# Patient Record
Sex: MALE | Race: BLACK OR AFRICAN AMERICAN | NOT HISPANIC OR LATINO | Employment: FULL TIME | URBAN - METROPOLITAN AREA
[De-identification: names, ages, dates, MRNs, and addresses within clinical notes are randomized per-mention and may not be internally consistent; named-entity substitution may affect disease eponyms.]

---

## 2023-01-04 ENCOUNTER — TELEPHONE (OUTPATIENT)
Dept: TRANSPLANT | Facility: CLINIC | Age: 53
End: 2023-01-04

## 2023-01-09 ENCOUNTER — SOCIAL WORK (OUTPATIENT)
Dept: TRANSPLANT | Facility: CLINIC | Age: 53
End: 2023-01-09

## 2023-01-09 ENCOUNTER — TELEPHONE (OUTPATIENT)
Dept: TRANSPLANT | Facility: CLINIC | Age: 53
End: 2023-01-09
Payer: COMMERCIAL

## 2023-01-27 ENCOUNTER — TELEPHONE (OUTPATIENT)
Dept: TRANSPLANT | Facility: CLINIC | Age: 53
End: 2023-01-27
Payer: COMMERCIAL

## 2023-01-27 DIAGNOSIS — Z76.82 ORGAN TRANSPLANT CANDIDATE: Primary | ICD-10-CM

## 2023-02-28 ENCOUNTER — TELEPHONE (OUTPATIENT)
Dept: TRANSPLANT | Facility: CLINIC | Age: 53
End: 2023-02-28
Payer: COMMERCIAL

## 2023-02-28 NOTE — TELEPHONE ENCOUNTER
Compliance form sent to [facility]/NEPHROLOGIST OFFICE on  02/28/23  Aslo LM for SW to call back and/or to please fax back the adherence form.    Chico Klein MA

## 2023-03-01 ENCOUNTER — HOSPITAL ENCOUNTER (OUTPATIENT)
Dept: RADIOLOGY | Facility: HOSPITAL | Age: 53
Discharge: HOME | End: 2023-03-01
Attending: NURSE PRACTITIONER
Payer: COMMERCIAL

## 2023-03-01 ENCOUNTER — TELEPHONE (OUTPATIENT)
Dept: TRANSPLANT | Facility: CLINIC | Age: 53
End: 2023-03-01
Payer: COMMERCIAL

## 2023-03-01 ENCOUNTER — HOSPITAL ENCOUNTER (OUTPATIENT)
Dept: RADIOLOGY | Facility: HOSPITAL | Age: 53
Discharge: HOME | End: 2023-03-01
Payer: COMMERCIAL

## 2023-03-01 ENCOUNTER — OFFICE VISIT (OUTPATIENT)
Dept: TRANSPLANT | Facility: CLINIC | Age: 53
End: 2023-03-01
Payer: COMMERCIAL

## 2023-03-01 ENCOUNTER — CLINICAL SUPPORT (OUTPATIENT)
Dept: INFECTIOUS DISEASES | Facility: CLINIC | Age: 53
End: 2023-03-01
Payer: COMMERCIAL

## 2023-03-01 VITALS
TEMPERATURE: 97 F | RESPIRATION RATE: 16 BRPM | WEIGHT: 204.81 LBS | BODY MASS INDEX: 31.04 KG/M2 | SYSTOLIC BLOOD PRESSURE: 140 MMHG | OXYGEN SATURATION: 97 % | DIASTOLIC BLOOD PRESSURE: 75 MMHG | HEIGHT: 68 IN | HEART RATE: 85 BPM

## 2023-03-01 DIAGNOSIS — Z76.82 ORGAN TRANSPLANT CANDIDATE: ICD-10-CM

## 2023-03-01 DIAGNOSIS — E21.3 HPTH (HYPERPARATHYROIDISM): ICD-10-CM

## 2023-03-01 DIAGNOSIS — Z99.2 ESRD ON DIALYSIS: ICD-10-CM

## 2023-03-01 DIAGNOSIS — N18.6 ESRD ON DIALYSIS: ICD-10-CM

## 2023-03-01 DIAGNOSIS — F32.A DEPRESSION, UNSPECIFIED DEPRESSION TYPE: ICD-10-CM

## 2023-03-01 DIAGNOSIS — I10 ESSENTIAL HYPERTENSION: ICD-10-CM

## 2023-03-01 DIAGNOSIS — Z01.818 PRE-TRANSPLANT EVALUATION FOR KIDNEY TRANSPLANT: Primary | ICD-10-CM

## 2023-03-01 LAB
CREAT UR-MCNC: 146 MG/DL (ref 23–375)
PROT UR-MCNC: 192 MG/DL (ref 0–15)
PROT/CREAT UR: 1.32 MG/G{CREAT} (ref 0–0.2)

## 2023-03-01 PROCEDURE — 99999 PR PBB SHADOW E&M-EST. PATIENT-LVL V: ICD-10-PCS | Mod: PBBFAC,TXP,, | Performed by: NURSE PRACTITIONER

## 2023-03-01 PROCEDURE — 3077F SYST BP >= 140 MM HG: CPT | Mod: CPTII,S$GLB,TXP, | Performed by: NURSE PRACTITIONER

## 2023-03-01 PROCEDURE — 90715 TDAP VACCINE GREATER THAN OR EQUAL TO 7YO IM: ICD-10-PCS | Mod: S$GLB,TXP,, | Performed by: INTERNAL MEDICINE

## 2023-03-01 PROCEDURE — 71046 X-RAY EXAM CHEST 2 VIEWS: CPT | Mod: 26,TXP,, | Performed by: RADIOLOGY

## 2023-03-01 PROCEDURE — 3077F PR MOST RECENT SYSTOLIC BLOOD PRESSURE >= 140 MM HG: ICD-10-PCS | Mod: CPTII,S$GLB,TXP, | Performed by: NURSE PRACTITIONER

## 2023-03-01 PROCEDURE — 90471 HEPATITIS A VACCINE ADULT IM: ICD-10-PCS | Mod: S$GLB,TXP,, | Performed by: INTERNAL MEDICINE

## 2023-03-01 PROCEDURE — 90750 HZV VACC RECOMBINANT IM: CPT | Mod: S$GLB,TXP,, | Performed by: INTERNAL MEDICINE

## 2023-03-01 PROCEDURE — 76770 US RETROPERITONEAL COMPLETE: ICD-10-PCS | Mod: 26,TXP,, | Performed by: STUDENT IN AN ORGANIZED HEALTH CARE EDUCATION/TRAINING PROGRAM

## 2023-03-01 PROCEDURE — 3008F BODY MASS INDEX DOCD: CPT | Mod: CPTII,S$GLB,TXP, | Performed by: NURSE PRACTITIONER

## 2023-03-01 PROCEDURE — 90472 ZOSTER RECOMBINANT VACCINE: ICD-10-PCS | Mod: S$GLB,TXP,, | Performed by: INTERNAL MEDICINE

## 2023-03-01 PROCEDURE — 3078F PR MOST RECENT DIASTOLIC BLOOD PRESSURE < 80 MM HG: ICD-10-PCS | Mod: CPTII,S$GLB,TXP, | Performed by: NURSE PRACTITIONER

## 2023-03-01 PROCEDURE — 1160F PR REVIEW ALL MEDS BY PRESCRIBER/CLIN PHARMACIST DOCUMENTED: ICD-10-PCS | Mod: CPTII,S$GLB,TXP, | Performed by: NURSE PRACTITIONER

## 2023-03-01 PROCEDURE — 3008F PR BODY MASS INDEX (BMI) DOCUMENTED: ICD-10-PCS | Mod: CPTII,S$GLB,TXP, | Performed by: NURSE PRACTITIONER

## 2023-03-01 PROCEDURE — 90632 HEPATITIS A VACCINE ADULT IM: ICD-10-PCS | Mod: S$GLB,TXP,, | Performed by: INTERNAL MEDICINE

## 2023-03-01 PROCEDURE — 1159F PR MEDICATION LIST DOCUMENTED IN MEDICAL RECORD: ICD-10-PCS | Mod: CPTII,S$GLB,TXP, | Performed by: NURSE PRACTITIONER

## 2023-03-01 PROCEDURE — 84156 ASSAY OF PROTEIN URINE: CPT | Mod: TXP | Performed by: NURSE PRACTITIONER

## 2023-03-01 PROCEDURE — 99205 OFFICE O/P NEW HI 60 MIN: CPT | Mod: S$GLB,TXP,, | Performed by: NURSE PRACTITIONER

## 2023-03-01 PROCEDURE — 90471 IMMUNIZATION ADMIN: CPT | Mod: S$GLB,TXP,, | Performed by: INTERNAL MEDICINE

## 2023-03-01 PROCEDURE — 4010F ACE/ARB THERAPY RXD/TAKEN: CPT | Mod: CPTII,S$GLB,TXP, | Performed by: NURSE PRACTITIONER

## 2023-03-01 PROCEDURE — 93978 US DOPP ILIACS BILATERAL: ICD-10-PCS | Mod: 26,TXP,, | Performed by: STUDENT IN AN ORGANIZED HEALTH CARE EDUCATION/TRAINING PROGRAM

## 2023-03-01 PROCEDURE — 71046 X-RAY EXAM CHEST 2 VIEWS: CPT | Mod: TC,TXP

## 2023-03-01 PROCEDURE — 4010F PR ACE/ARB THEARPY RXD/TAKEN: ICD-10-PCS | Mod: CPTII,S$GLB,TXP, | Performed by: NURSE PRACTITIONER

## 2023-03-01 PROCEDURE — 90472 IMMUNIZATION ADMIN EACH ADD: CPT | Mod: S$GLB,TXP,, | Performed by: INTERNAL MEDICINE

## 2023-03-01 PROCEDURE — 76770 US EXAM ABDO BACK WALL COMP: CPT | Mod: TC,TXP,59

## 2023-03-01 PROCEDURE — 90750 ZOSTER RECOMBINANT VACCINE: ICD-10-PCS | Mod: S$GLB,TXP,, | Performed by: INTERNAL MEDICINE

## 2023-03-01 PROCEDURE — 99999 PR PBB SHADOW E&M-EST. PATIENT-LVL I: CPT | Mod: PBBFAC,TXP,,

## 2023-03-01 PROCEDURE — 99214 OFFICE O/P EST MOD 30 MIN: CPT | Mod: S$GLB,TXP,, | Performed by: TRANSPLANT SURGERY

## 2023-03-01 PROCEDURE — 99205 PR OFFICE/OUTPT VISIT, NEW, LEVL V, 60-74 MIN: ICD-10-PCS | Mod: S$GLB,TXP,, | Performed by: NURSE PRACTITIONER

## 2023-03-01 PROCEDURE — 72170 X-RAY EXAM OF PELVIS: CPT | Mod: TC,TXP

## 2023-03-01 PROCEDURE — 72170 XR PELVIS ROUTINE AP: ICD-10-PCS | Mod: 26,TXP,, | Performed by: RADIOLOGY

## 2023-03-01 PROCEDURE — 1160F RVW MEDS BY RX/DR IN RCRD: CPT | Mod: CPTII,S$GLB,TXP, | Performed by: NURSE PRACTITIONER

## 2023-03-01 PROCEDURE — 1159F MED LIST DOCD IN RCRD: CPT | Mod: CPTII,S$GLB,TXP, | Performed by: NURSE PRACTITIONER

## 2023-03-01 PROCEDURE — 90715 TDAP VACCINE 7 YRS/> IM: CPT | Mod: S$GLB,TXP,, | Performed by: INTERNAL MEDICINE

## 2023-03-01 PROCEDURE — 90632 HEPA VACCINE ADULT IM: CPT | Mod: S$GLB,TXP,, | Performed by: INTERNAL MEDICINE

## 2023-03-01 PROCEDURE — 99214 PR OFFICE/OUTPT VISIT, EST, LEVL IV, 30-39 MIN: ICD-10-PCS | Mod: S$GLB,TXP,, | Performed by: TRANSPLANT SURGERY

## 2023-03-01 PROCEDURE — 93978 VASCULAR STUDY: CPT | Mod: 26,TXP,, | Performed by: STUDENT IN AN ORGANIZED HEALTH CARE EDUCATION/TRAINING PROGRAM

## 2023-03-01 PROCEDURE — 93978 VASCULAR STUDY: CPT | Mod: TC,TXP

## 2023-03-01 PROCEDURE — 99999 PR PBB SHADOW E&M-EST. PATIENT-LVL I: ICD-10-PCS | Mod: PBBFAC,TXP,,

## 2023-03-01 PROCEDURE — 72170 X-RAY EXAM OF PELVIS: CPT | Mod: 26,TXP,, | Performed by: RADIOLOGY

## 2023-03-01 PROCEDURE — 3078F DIAST BP <80 MM HG: CPT | Mod: CPTII,S$GLB,TXP, | Performed by: NURSE PRACTITIONER

## 2023-03-01 PROCEDURE — 76770 US EXAM ABDO BACK WALL COMP: CPT | Mod: 26,TXP,, | Performed by: STUDENT IN AN ORGANIZED HEALTH CARE EDUCATION/TRAINING PROGRAM

## 2023-03-01 PROCEDURE — 99999 PR PBB SHADOW E&M-EST. PATIENT-LVL V: CPT | Mod: PBBFAC,TXP,, | Performed by: NURSE PRACTITIONER

## 2023-03-01 PROCEDURE — 71046 XR CHEST PA AND LATERAL: ICD-10-PCS | Mod: 26,TXP,, | Performed by: RADIOLOGY

## 2023-03-01 RX ORDER — DIPHENHYDRAMINE HCL 12.5MG/5ML
25 LIQUID (ML) ORAL NIGHTLY PRN
COMMUNITY

## 2023-03-01 RX ORDER — FOLIC ACID/VIT B COMPLEX AND C 0.8 MG
TABLET ORAL
COMMUNITY
Start: 2022-03-10

## 2023-03-01 RX ORDER — SERTRALINE HYDROCHLORIDE 50 MG/1
50 TABLET, FILM COATED ORAL NIGHTLY
COMMUNITY
Start: 2023-02-09

## 2023-03-01 RX ORDER — GENTAMICIN SULFATE 1 MG/G
CREAM TOPICAL
COMMUNITY
Start: 2023-02-01

## 2023-03-01 RX ORDER — LACTULOSE 10 G/15ML
SOLUTION ORAL
COMMUNITY
Start: 2023-02-01

## 2023-03-01 RX ORDER — TAMSULOSIN HYDROCHLORIDE 0.4 MG/1
CAPSULE ORAL
COMMUNITY
Start: 2023-01-30

## 2023-03-01 RX ORDER — CALCITRIOL 0.5 UG/1
CAPSULE ORAL
COMMUNITY
Start: 2022-03-22

## 2023-03-01 RX ORDER — BUSPIRONE HYDROCHLORIDE 5 MG/1
5 TABLET ORAL 2 TIMES DAILY
COMMUNITY
Start: 2022-11-16

## 2023-03-01 RX ORDER — PANTOPRAZOLE SODIUM 40 MG/1
40 TABLET, DELAYED RELEASE ORAL DAILY
COMMUNITY
Start: 2022-11-30

## 2023-03-01 RX ORDER — HYDRALAZINE HYDROCHLORIDE AND ISOSORBIDE DINITRATE 37.5; 2 MG/1; MG/1
TABLET, FILM COATED ORAL 3 TIMES DAILY
COMMUNITY
Start: 2023-02-01

## 2023-03-01 RX ORDER — SUCROFERRIC OXYHYDROXIDE 500 MG/1
2 TABLET, CHEWABLE ORAL
COMMUNITY
Start: 2022-04-10

## 2023-03-01 RX ORDER — HYDROXYZINE HYDROCHLORIDE 25 MG/1
1 TABLET, FILM COATED ORAL 3 TIMES DAILY PRN
COMMUNITY
Start: 2022-08-12

## 2023-03-01 RX ORDER — FUROSEMIDE 80 MG/1
1 TABLET ORAL 2 TIMES DAILY
COMMUNITY
Start: 2022-05-30

## 2023-03-01 RX ORDER — LOSARTAN POTASSIUM 25 MG/1
25 TABLET ORAL DAILY
COMMUNITY
Start: 2023-01-30

## 2023-03-01 RX ORDER — CARVEDILOL 25 MG/1
1 TABLET ORAL 2 TIMES DAILY
COMMUNITY
Start: 2022-10-21

## 2023-03-01 NOTE — PROGRESS NOTES
PHARM.D. PRE-TRANSPLANT NOTE:    This patient's medication therapy was evaluated as part of his pre-transplant evaluation.      The following general pharmacologic concerns were noted: resume Buspar and Zoloft after transplant    The following concerns for post-operative pain management were noted: none    The following pharmacologic concerns related to HCV therapy were noted: none      This patient's medication profile was reviewed for considerations for DAA Hepatitis C therapy:    [x]  No current inducers of CYP 3A4 or PGP  [x]  No amiodarone on this patient's EMR profile in the last 24 months  [x]  No past or current atrial fibrillation on this patient's EMR profile       Current Outpatient Medications   Medication Sig Dispense Refill    B complex-vitamin C-folic acid (NEPHRO-JENNIFER) 0.8 mg Tab Nephro-Jennifer      BIDIL 20-37.5 mg Tab Take by mouth 3 (three) times daily.      busPIRone (BUSPAR) 5 MG Tab Take 5 mg by mouth 2 (two) times daily.      calcitRIOL (ROCALTROL) 0.5 MCG Cap Take by mouth.      carvediloL (COREG) 25 MG tablet Take 1 tablet by mouth 2 (two) times daily.      diphenhydrAMINE (BENYLIN) 12.5 mg/5 mL liquid Take 25 mg by mouth nightly as needed (Sleep).      furosemide (LASIX) 80 MG tablet Take 1 tablet by mouth 2 (two) times a day.      gentamicin (GARAMYCIN) 0.1 % cream SMARTSIG:Sparingly Topical Daily      hydrOXYzine HCL (ATARAX) 25 MG tablet Take 1 tablet by mouth 3 times daily as needed.      lactulose (CHRONULAC) 20 gram/30 mL Soln SMARTSIG:Milliliter(s) By Mouth Daily PRN      losartan (COZAAR) 25 MG tablet Take 25 mg by mouth once daily.      pantoprazole (PROTONIX) 40 MG tablet Take 40 mg by mouth once daily.      sertraline (ZOLOFT) 50 MG tablet Take 50 mg by mouth every evening.      sucroferric oxyhydroxide (VELPHORO) 500 mg Chew 2 tablets 3 (three) times daily with meals.      tamsulosin (FLOMAX) 0.4 mg Cap Take by mouth.       No current facility-administered medications for this  visit.           I am available for consultation and can be contacted, as needed by the other members of the Transplant team.

## 2023-03-01 NOTE — TELEPHONE ENCOUNTER
Reviewed pt transplant labs.  Notified dialysis unit dietitian of the following abnormal labs via fax and requested their most recent nutrition note on this pt.  Once this note is received it will be scanned into pt's chart.    Glucose 137  Phos 5.8

## 2023-03-01 NOTE — PROGRESS NOTES
PRE-TRANSPLANT INFECTIOUS DISEASE CONSULT    Reason for Visit:  Pre-transplant evaluation  Referring Provider: Dr. Taiwo Lewis     History of Present Illness:    52 y.o. male with a history of ESRD 2/2 HTN on  presents for pre-kidney transplant evaluation.  He is currently on PD.  Denies history of PD cath infections/peritonitis    Infectious History:  Recent hospital admissions: No  Recent infections: No  Recent or current antibiotic use: No  History of recurrent infections *(sinus / pneumonia / UTI / SBP)*: No  Recent dental infections, issues or procedures: No  History of chicken pox: Yes  History of shingles: No  History of STI: No  History of COVID infection: No    History of Immunosuppression:  Prior chemotherapy / immunosuppression: No  Prior transplant: No  History of splenectomy: No    Tuberculosis:  Prior screening for latent TB: Yes  Prior diagnosis of latent TB: No  Risk factors for TB *known exposure, incarceration, homelessness*: No    Geographical exposures:  Currently lives in [place] with lives alone.  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  Fishing.  Counseled.   Hobbies: Watching sports  Water: Well water  Counseld re precautions with well water.   Consumption of raw/undercooked meat or seafood?  No  Tobacco: No  Alcohol: No  Recreational drug use:  No  Sexual partners: women.  One partner      Past Histories:   Past Medical History:   Diagnosis Date    Disorder of kidney and ureter     Hypertension      Past Surgical History:   Procedure Laterality Date    fistillia Left     PERITONEAL CATHETER INSERTION Right      Family History   Problem Relation Age of Onset    Hypertension Mother     Diabetes Mother     Hypertension Father     Heart disease Father      No Known Problems Daughter     Hypertension Maternal Aunt     Hypertension Paternal Uncle      Social History     Tobacco Use    Smoking status: Never    Smokeless tobacco: Never   Substance Use Topics    Alcohol use: Not Currently    Drug use: Never     Review of patient's allergies indicates:  No Known Allergies      Immunization History:  Received all childhood vaccines: Yes  All household members receive annual flu vaccine: Yes  All household members are up to date on COVID vaccine: Yes    Immunization History   Administered Date(s) Administered    COVID-19, MRNA, LN-S, PF (Pfizer) (Purple Cap) 04/15/2021, 05/06/2021    Hepatitis B (recombinant) Adjuvanted, 2 dose 02/02/2023    Pneumococcal Conjugate - 20 Valent 02/06/2023      Immunization:   Needs COVID bivalent   Flu - up to date   Hepatitis A - denies   Hepatitis B - started in dialysis   Pneumonia vaccine - also PCV 13 6/27/22 and PPSV23 on 3/23/20 per [facility] record  Tdap - unknown   Shingles vaccine - denies    Current antibiotics:  Antibiotics (From admission, onward)      None            Review of Systems  Review of Systems   All other systems reviewed and are negative.   No fevers, chills, night sweats  No open skin wounds, skin rashes      Objective  Physical Exam  Vitals reviewed.   Constitutional:       General: He is not in acute distress.     Appearance: Normal appearance. He is not ill-appearing.   HENT:      Head: Normocephalic and atraumatic.      Mouth/Throat:      Mouth: Mucous membranes are moist.   Eyes:      General: No scleral icterus.     Conjunctiva/sclera: Conjunctivae normal.   Pulmonary:      Effort: Pulmonary effort is normal. No respiratory distress.   Abdominal:      General: There is no distension.      Palpations: Abdomen is soft.      Comments: PD catheter in place - c/d/i   Musculoskeletal:      Cervical back: Normal range of motion.   Skin:     General: Skin is warm and dry.   Neurological:      Mental Status: He is alert  and oriented to person, place, and time.   Psychiatric:         Mood and Affect: Mood normal.         Behavior: Behavior normal.         Thought Content: Thought content normal.         Judgment: Judgment normal.       MELD: *liver transplant only*  MELD-Na score: 21 at 3/1/2023  7:36 AM  MELD score: 21 at 3/1/2023  7:36 AM  Calculated from:  Serum Creatinine: On dialysis. Using 4 mg/dL.  Serum Sodium: 137 mmol/L at 3/1/2023  7:36 AM  Total Bilirubin: 0.9 mg/dL (Using min of 1 mg/dL) at 3/1/2023  7:36 AM  INR(ratio): 1.1 at 3/1/2023  7:36 AM  Age: 52 years      Labs:    CBC:   Lab Results   Component Value Date    WBC 4.51 03/01/2023    HGB 10.8 (L) 03/01/2023    HCT 35.2 (L) 03/01/2023    MCV 77 (L) 03/01/2023     03/01/2023    GRAN 2.9 03/01/2023    GRAN 63.3 03/01/2023    LYMPH 0.9 (L) 03/01/2023    LYMPH 20.4 03/01/2023    MONO 0.4 03/01/2023    MONO 9.8 03/01/2023    EOSINOPHIL 4.7 03/01/2023       CMP:   Lab Results   Component Value Date     03/01/2023    K 4.3 03/01/2023     03/01/2023    CO2 22 (L) 03/01/2023     (H) 03/01/2023    BUN 77 (H) 03/01/2023    CREATININE 18.2 (H) 03/01/2023    CALCIUM 9.1 03/01/2023    ALBUMIN 3.7 03/01/2023    PROT 7.7 03/01/2023    ALT 39 03/01/2023    AST 28 03/01/2023    ALKPHOS 30 (L) 03/01/2023    BILITOT 0.9 03/01/2023       Syphilis screening: No results found for: RPR, PRPQ, FTAABS     TB screening: No results found for: TBGOLDPLUS, TSPOTSCREN    HIV screening:   Lab Results   Component Value Date    [id number] Non-reactive 03/01/2023       Strongyloides IgG: No results found for: STRONGANTIGG    Hepatitis Serologies:   Lab Results   Component Value Date    HEPBCAB Non-reactive 03/01/2023    HEPCAB Non-reactive 03/01/2023        Varicella IgG:   Lab Results   Component Value Date    VARICELLAINT Positive 03/01/2023       Recent Microbiology:   Microbiology Results (last 7 days)       ** No results found for the last 168 hours. **             Radiology:  No imaging results available at time of evaluation    Assessment and Plan    1. Risks of Infection: Available serologies were reviewed. No unusual risks of infection or significant barriers to transplantation were identified from the infectious disease standpoint given the available information   - Pending serologies: Hepatitis B surface Ab, Hepatitis B surface Ag, Quantiferon gold / T-spot, RPR, and Strongyloides IgG.  ADDENDUM 3/4 - QG, Strongy, RPR, Hep B SAg negative    2. Immunizations:  Based on the patient's immunization history and serologies, the following immunizations are recommended:  - Hepatitis A    Patient does not have immunity to hepatitis A - unknown. Denies Hep A vaccination    Vaccination required: Yes.  Ordered.  Rx given for second dose   - Hepatitis B    Patient does not have immunity to hepatitis B - surface antibody pending.  Addendum - Hep B surface antibody negative    Vaccination ordered today: No    If not ordered, reason for not vaccinating: Other (specify) - Patient has started Hep B series in dialysis - 2nd dose scheduled    - COVID    Current CDC vaccination recommendations were discussed with the patient and family.  Recommend Bivalent booster   - Influenza     Annual, high dose preferred.  Up to date for this flu season   - Prevnar 20 - up to date   - Tdap - ordered   - Shingrix - ordered.  Rx given for second dose    Recommended Pre-Transplant Immunization Schedule  Vaccine  0m 1m 2m 6m   Pneumococcal conjugate vaccine (Prevnar 20) X      Tetanus-diphtheria-pertussis (Tdap)* X      Hepatitis A Vaccine (Havrix)** X   X   Hepatitis B Vaccine (Heplisav)** X X     Influenza X      Zoster Recombinant Vaccine (Shingrix) X  X           *Administer booster every 10 years.       **Administer if no immunity demonstrated on serologies                 3. Counseling:   I discussed with the patient and his aunt the risk for increased susceptibility to infections following  transplantation including increased risk for infection right after transplant and if rejection should occur.  The patient has been counseled on the importance of vaccinations including but not limited to a yearly flu vaccine. Patient was also instructed to encourage that family/caretakers receive their flu vaccine yearly. The patient was encouraged to contact us about any problems that may develop after immunizations and possible side effects were reviewed.     Specific guidance has been provided to the patient regarding the patient's occupation, hobbies and activities to avoid future infectious complications. These include but are not limited to: avoiding raw/undercooked meats and seafood, avoiding unpasteurized milk/cheeses, proper (hand) hygiene, contact with animals and appropriate vaccination of animals, use of mosquito/tick precautions, avoiding walking barefoot, avoiding sick contacts, and seeking medical advice prior to foreign travel (specifically developing countries).     4. Transplant Candidacy: Based on available information, there are no identified significant barriers to transplantation from an infectious disease standpoint.  Final determination of transplant candidacy will be made once evaluation is complete and reviewed by the Selection Committee.      Follow up with infectious disease as needed. Please call if any pending serologic testing is positive.      The total time for evaluation and management services performed on 03/01/2023 was greater than 40 minutes.

## 2023-03-01 NOTE — PROGRESS NOTES
INITIAL PATIENT EDUCATION NOTE    Mr. Yassine Cox was seen in pre-kidney transplant clinic for evaluation for kidney, kidney/pancreas or pancreas only transplant.  The patient attended a an individual video education session that discussed/reviewed the following aspects of transplantation: evaluation including diagnostic and laboratory testing,( Chemistries, Hematology, Serologies including HIV and Hepatitis and HLA) required for transplantation and selection committee process, UNOS waitlist management/multiple listings, types of organs offered (KDPI < 85%, KDPI > 85%, PHS risk, DCD, HCV+, HIV+ for HIV+ recipients and enbloc/dual), financial aspects, surgical procedures, dietary instruction pre- and post-transplant, health maintenance pre- and post-transplant, post-transplant hospitalization and outpatient follow-up, potential to participate in a research protocol, and medication management and side effects.  A question and answer session was provided after the presentation.    The patient was seen by all members of the multi-disciplinary team to include: Nephrologist/JUAN, Surgeon, , Transplant Coordinator, , Pharmacist and Dietician (if applicable).    The patient reviewed and signed all consents for evaluation which were witnessed and sent to scanning into the [facility] chart.    The patient was given an education book and plan for further evaluation based on his individual assessment.      Reviewed program requirement for complete COVID vaccination with documentation prior to listing.  COVID education information reviewed with patient. Patient encouraged to be up to date on all vaccinations.       The patient was informed that the transplant team would manage immediate post op pain. If the patient requires long term pain management, they will need to have that pain management addressed by their PCP or previous provider who wrote for long term pain medicines.    The patient was  encouraged to call with any questions or concerns.

## 2023-03-01 NOTE — PROGRESS NOTES
Patient received zoster #1, Hep A #1, and Tdap vaccines in the right deltoid. Pt tolerated well. Pt asked to wait in the clinic 15 minutes after injection in the event of an allergic reaction. Pt verbalized understanding. Pt left in NAD.

## 2023-03-01 NOTE — LETTER
March 3, 2023        Taiwo Lewis  [address] [place] [address]  Phone: [phone]  Fax: [phone]             [facility] Trans[facility] 1st Fl  [address]  [facility] [address]  Phone: [phone]   Patient: Yassine Cox   MR Number: [id number]   YOB: 1970   Date of Visit: 3/1/2023       Dear Dr. Taiwo Lewis    Thank you for referring Yassine Cox to me for evaluation. Attached you will find relevant portions of my assessment and plan of care.    If you have questions, please do not hesitate to call me. I look forward to following Yassine Cox along with you.    Sincerely,    Suzette De Luna, NP    Enclosure    If you would like to receive this communication electronically, please contact [email or web address] or [phone] to request [facility] [employer] access.    [facility] Link is a tool which provides read-only access to select patient information with whom you have a relationship. Its easy to use and provides real time access to review your patients record including encounter summaries, notes, results, and demographic information.    If you feel you have received this communication in error or would no longer like to receive these types of communications, please e-mail [email or web address]

## 2023-03-01 NOTE — PROGRESS NOTES
Transplant Nephrology  Kidney Transplant Recipient Evaluation    Referring Physician: Taiwo Lewis  Current Nephrologist: Taiwo Lewis    Subjective:   CC:  Initial evaluation of kidney transplant candidacy.    HPI:  Mr. Cox is a 52 y.o. year old Black or  male who has presented to be evaluated as a potential kidney transplant recipient.  He has ESRD secondary to HTN.  Patient is currently on hemodialysis started on 2/12/22 he has since started on PD. No peritonitis. Patient is dialyzing on cyclic peritoneal dialysis.  Patient reports that he is tolerating dialysis well.. He has a PD catheter for dialysis access.     Body mass index is 30.79 kg/m².    Mr. Cox was diagnosed approx 4 year ago with HTN during his annual physical for his [employer] licence. Since then he has been on BP medications. He was seen at [facility] on 2/11/22 for acute renal failure that turned out to be progressive ESRD. HD was initiated in the hospital on 2/12/22. HTN. He is following with Cardiology for nonischemic cardiomyopathy. History of EF of 20-25% last year (at the time of starting dialysis) but since recovered with EF 60-65% on last echo in 11/2022.  Denies DM, pulmonary disease, liver disease, stroke, DVt/PE, chronic wounds/infections/amputations.  Functional Status: he is active not frail    Previous Transplant: no  Previous Blood Transfusion: none  Previous neurogenic bladder/ urine incontinence: still urinates regularly and is on lasix  Anticoagulation/ antiplatelet therapy and reason: none  Potential Donor: no  High KDPI candidate: no due to weight  Meets center eligibility for accepting HCV+ donor offer: yes        Current Outpatient Medications:     B complex-vitamin C-folic acid (NEPHRO-JENNIFER) 0.8 mg Tab, Nephro-Jennifer, Disp: , Rfl:     BIDIL 20-37.5 mg Tab, Take by mouth 3 (three) times daily., Disp: , Rfl:     busPIRone (BUSPAR) 5 MG Tab, Take 5 mg by mouth 2 (two) times daily., Disp: , Rfl:      calcitRIOL (ROCALTROL) 0.5 MCG Cap, Take by mouth., Disp: , Rfl:     carvediloL (COREG) 25 MG tablet, Take 1 tablet by mouth 2 (two) times daily., Disp: , Rfl:     diphenhydrAMINE (BENYLIN) 12.5 mg/5 mL liquid, Take 25 mg by mouth nightly as needed (Sleep)., Disp: , Rfl:     furosemide (LASIX) 80 MG tablet, Take 1 tablet by mouth 2 (two) times a day., Disp: , Rfl:     gentamicin (GARAMYCIN) 0.1 % cream, SMARTSIG:Sparingly Topical Daily, Disp: , Rfl:     hydrOXYzine HCL (ATARAX) 25 MG tablet, Take 1 tablet by mouth 3 times daily as needed., Disp: , Rfl:     lactulose (CHRONULAC) 20 gram/30 mL Soln, SMARTSIG:Milliliter(s) By Mouth Daily PRN, Disp: , Rfl:     losartan (COZAAR) 25 MG tablet, Take 25 mg by mouth once daily., Disp: , Rfl:     pantoprazole (PROTONIX) 40 MG tablet, Take 40 mg by mouth once daily., Disp: , Rfl:     sertraline (ZOLOFT) 50 MG tablet, Take 50 mg by mouth every evening., Disp: , Rfl:     sucroferric oxyhydroxide (VELPHORO) 500 mg Chew, 2 tablets 3 (three) times daily with meals., Disp: , Rfl:     tamsulosin (FLOMAX) 0.4 mg Cap, Take by mouth., Disp: , Rfl:       Past Medical and Surgical History: Mr. Cox  has a past medical history of Disorder of kidney and ureter and Hypertension.  He has a past surgical history that includes Peritoneal catheter insertion (Right) and fistillia (Left).    Past Social and Family History: Mr. Cox reports that he has never smoked. He has never used smokeless tobacco. He reports that he does not currently use alcohol. He reports that he does not use drugs. His family history includes Diabetes in his mother; Heart disease in his father; Hypertension in his father, maternal aunt, mother, and paternal uncle; No Known Problems in his daughter.    Review of Systems   Constitutional:  Negative for appetite change, chills, fatigue and fever.   HENT:  Negative for trouble swallowing.    Respiratory:  Negative for cough, chest tightness, shortness of breath and wheezing.  "   Cardiovascular:  Negative for chest pain, palpitations and leg swelling.   Gastrointestinal:  Negative for abdominal pain, constipation, diarrhea and nausea.   Genitourinary:  Positive for decreased urine volume. Negative for difficulty urinating, frequency and urgency.   Musculoskeletal:  Negative for arthralgias and myalgias.   Skin:  Negative for rash.   Neurological:  Negative for dizziness, weakness, light-headedness and headaches.   Psychiatric/Behavioral:  Positive for sleep disturbance. The patient is nervous/anxious.      Objective:   Blood pressure (!) 140/75, pulse 85, temperature 97.3 °F (36.3 °C), temperature source Tympanic, resp. rate 16, height 5' 8.39" (1.737 m), weight 92.9 kg (204 lb 12.9 oz), SpO2 97 %.body mass index is 30.79 kg/m².    Physical Exam  Constitutional:       General: He is not in acute distress.     Appearance: He is well-developed. He is not diaphoretic.   Cardiovascular:      Rate and Rhythm: Normal rate and regular rhythm.      Heart sounds: Normal heart sounds. No murmur heard.    No friction rub. No gallop.   Pulmonary:      Effort: Pulmonary effort is normal. No respiratory distress.      Breath sounds: Normal breath sounds. No wheezing or rales.   Abdominal:      General: Bowel sounds are normal. There is no distension.      Palpations: Abdomen is soft.      Tenderness: There is no abdominal tenderness.   Musculoskeletal:         General: No tenderness. Normal range of motion.   Skin:     General: Skin is warm and dry.      Findings: No rash.      Nails: There is no clubbing.   Neurological:      Mental Status: He is alert and oriented to person, place, and time.   Psychiatric:         Behavior: Behavior normal.       Labs:  Lab Results   Component Value Date    WBC 4.51 03/01/2023    HGB 10.8 (L) 03/01/2023    HCT 35.2 (L) 03/01/2023       No results found for: PREALBUMIN, BILIRUBINUA, GGT, AMYLASE, LIPASE, PROTEINUA, NITRITE, RBCUA, WBCUA    No results found for: " HLAABCTYPE    Labs were reviewed with the patient.    Assessment:     1. Pre-transplant evaluation for kidney transplant    2. ESRD on dialysis    3. Essential hypertension    4. HPTH (hyperparathyroidism)    5. Depression, unspecified depression type        Plan:   Mr. Cox is a 52 y.o. year old Black or  male who has presented to be evaluated as a potential kidney transplant recipient.  He has ESRD secondary to HTN.  Who started dialysis on 2/12/22. And is on PD    Prior to Listing, will need the following items to be completed:  1. Standard serologies, cardiac and imaging studies   2. Urine p/c ratio    Transplant Candidacy:   Based on available information, Mr. Cox is a suitable kidney transplant candidate.   Meets center eligibility for accepting HCV+ donor offer - yes.  Patient educated on HCV+ donors. Yassine is willing to accept HCV+ donor offer - yes   Patient is a candidate for KDPI > 85 kidney donor offer - no. Due to weight  Final determination of transplant candidacy will be made once workup is complete and reviewed by the selection committee.    Patient advised that it is recommended that all transplant candidates, and their close contacts and household members receive Covid vaccination.    Suzette De Luna NP       Counseling:  Exercise: reminded patient of the importance of regular exercise for weight management, blood sugar and blood pressure management.  I also explained exercise has been shown to improve cardiovascular health, energy level, and sleep hygiene.  Lastly, I advised her that cardiovascular complications are leading cause of death for renal transplant recipients, and regular exercise can help lower this risk.    We discussed various aspects of kidney transplantation including transplant surgery, immunosuppressive medications and the need for close follow up. We also discussed side effects of immunosuppression including weight gain, hypertension, hyperlipidemia,  new-onset diabetes after transplantation, infections and malignancies, especially skin cancers and lymphomas. I also reviewed the risk of acute rejection, vascular thrombosis, recurrent disease and potential transmission of infections such as hepatitis and HIV. I informed the patient that the average time on the wait list in the [facility] is between 5 to 7 years.       UNOS Patient Status  Functional Status: 60% - Requires occasional assistance but is able to care for needs  Physical Capacity: No Limitations

## 2023-03-01 NOTE — PROGRESS NOTES
Transplant Surgery  Kidney Transplant Recipient Evaluation    Referring Physician: Taiwo Lewis  Current Nephrologist: Taiwo Lewis    Subjective:     Reason for Visit: evaluate transplant candidacy    History of Present Illness: Yassine Cox is a 52 y.o. year old male undergoing transplant evaluation.    Dialysis History: Yassine is on peritoneal dialysis.      Transplant History: N/A    Etiology of Renal Disease: Hypertensive Nephrosclerosis (based on medical records from referral).    External provider notes reviewed: Yes    Review of Systems   Constitutional:  Negative for activity change, appetite change, chills, diaphoresis, fatigue, fever and unexpected weight change.   HENT:  Negative for congestion, dental problem, ear pain, facial swelling, mouth sores, nosebleeds, sore throat, tinnitus, trouble swallowing and voice change.    Eyes:  Negative for photophobia, pain and visual disturbance.   Respiratory:  Negative for apnea, cough, choking, chest tightness and shortness of breath.    Cardiovascular:  Negative for chest pain, palpitations and leg swelling.   Gastrointestinal:  Negative for abdominal distention, abdominal pain, blood in stool, constipation, diarrhea, nausea and vomiting.   Endocrine: Negative for cold intolerance and heat intolerance.   Genitourinary:  Negative for difficulty urinating, dysuria, flank pain, hematuria and urgency.   Musculoskeletal:  Negative for arthralgias and gait problem.   Skin:  Negative for color change, pallor and rash.   Neurological:  Negative for dizziness, tremors, seizures, syncope and light-headedness.   Hematological:  Negative for adenopathy. Does not bruise/bleed easily.   Psychiatric/Behavioral:  Negative for agitation and confusion.    Objective:     Physical Exam:  Constitutional:   Vitals reviewed: yes   Well-nourished and well-groomed: yes  Eyes:   Sclerae icteric: no   Extraocular movements intact: yes  GI:    Bowel sounds normal: yes   Tenderness:  no    If yes, quadrant/location: not applicable   Palpable masses: no    If yes, quadrant/location: not applicable   Hepatosplenomegaly: no   Ascites: no   Hernia: no    If yes, type/location: not applicable   Surgical scars: yes    If yes, type/location: PD catheter RLQ  Resp:   Effort normal: yes   Breath sounds normal: yes    CV:   Regular rate and rhythm: yes   Heart sounds normal: yes   Femoral pulses normal: yes   Extremities edematous: no  Skin:   Rashes or lesions present: no    If yes, describe:not applicable   Jaundice:: no    Musculoskeletal:   Gait normal: yes   Strength normal: yes  Psych:   Oriented to person, place, and time: yes   Affect and mood normal: yes    Additional comments: not applicable    Diagnostics:  The following labs have been reviewed: CBC  CMP  PT  INR  PTT  Abo  Lipid panel  pth  The following radiology images have been independently reviewed and interpreted: pending    Counseling: We provided Yassine Cox with a group education session today.  We discussed kidney transplantation at length with him, including risks, potential complications, and alternatives in the management of his renal failure.  The discussion included complications related to anesthesia, bleeding, infection, primary nonfunction, and ATN.  I discussed the typical postoperative course, length of hospitalization, the need for long-term immunosuppression, and the need for long-term routine follow-up.  I discussed living-donor and -donor transplantation and the relative advantages and disadvantages of each.  I also discussed average waiting times for both living donation and  donation.  I discussed national and center-specific survival rates.  I also mentioned the potential benefit of multicenter listing to candidates listed with centers within more than one organ procurement organization.  All questions were answered.    Patient advised that it is recommended that all transplant candidates, and their  close contacts and household members receive Covid vaccination.    Final determination of transplant candidacy will be made once evaluation is complete and reviewed by the Kidney & Kidney/Pancreas Selection Committee.    Coronavirus disease (COVID-19) caused by severe acute respiratory virus coronavirus 2 (SARS-C0V 2) is associated with increased mortality in solid organ transplant recipients (SOT) compared to non-transplant patients. Vaccine responses to vaccination are depressed against SARS-CoV2 compared to normal individuals but improve with third vaccination doses. Vaccination prior to SOT provides both the best opportunity for transplant candidates to develop protective immunity and to reduce the risk of serious COVID19 infections post transplantation. Organ transplant candidates at [facility] will be required to receive SARS-CoV-2 vaccination prior to being listed with a an active status, whenever possible. Exceptions will be made for disability related reasons or for sincerely held Druze beliefs.          Transplant Surgery - Candidacy   Assessment/Plan:   Yassine Cox has end stage renal disease (ESRD) on dialysis. I see no surgical contraindication to placing a kidney transplant. Based on available information, Yassine Cox is a suitable kidney transplant candidate.     Additional testing to be completed according to the Written Order Guidelines for Adult Pre-kidney and Pancreas Transplant Evaluation (KI-02).  Interpretation of tests and discussion of patient management involves all members of the multidisciplinary transplant team.    Danielle Samuel MD

## 2023-03-02 NOTE — PROGRESS NOTES
Transplant Recipient Adult Psychosocial Assessment    Yassine Cox  [address] [place] [facility] [facility] [address]  Telephone Information:   Mobile [phone]   Home  [phone] (home)  Work  There is no work phone number on file.  E-mail  No e-mail address on record    Sex: male  YOB: 1970  Age: 52 y.o.    Encounter Date: 3/1/2023  [place] Citizen: yes  Primary Language: English   Needed: no    Emergency Contact:  Gricel Messer, 59 yo aunt, [place], does drive/own car, works full time in Behavioral Health (works remote). [phone]    Family/Social Support:   Number of dependents/: pt denies  Marital history: single, never   Other family dynamics: Pt reports father is . Mother lives in hospitals and patient reports is unsure if she will be able to assist with transplant. Pt reports is only child. Pt reports lives alone. Pt reports is working full time at [employer] as  for 8 years. Pt reports highly supportive aunts (Noemi Cox and Gricel Messer) living nearby in [place]. Pt reports aunts will be assisting with transplant as needed.    Household Composition:  Pt reports lives alone.    Do you and your caregivers have access to reliable transportation? yes  PRIMARY CAREGIVER: Gricel Messer, aunt, will be primary caregiver, phone number [phone]      provided in-depth information to patient and caregiver regarding pre- and post-transplant caregiver role.   strongly encourages patient and caregiver to have concrete plan regarding post-transplant care giving, including back-up caregiver(s) to ensure care giving needs are met as needed.    Patient and Caregiver states understanding all aspects of caregiver role/commitment and is able/willing/committed to being caregiver to the fullest extent necessary.    Patient and Caregiver verbalizes understanding of the education provided today and caregiver  responsibilities.         remains available. Patient and Caregiver agree to contact  in a timely manner if concerns arise.      Able to take time off work without financial concerns: yes.     Additional Significant Others who will Assist with Transplant:  Noemi Pike, 69 yo aunt, [place], does drive/own car, is retired. [phone]    Living Will: no  Healthcare Power of : no  Advance Directives on file: <<no information> per medical record.  Verbally reviewed LW/HCPA information.   provided patient with copy of LW/HCPA documents and provided education on completion of forms.    Living Donors: Education and resource information given to patient.    Highest Education Level: Attended College/Technical School  Reading Ability: 12th grade  Reports difficulty with: N/A  Learns Best By:  multisensory     Status: no  VA Benefits: no     Working for Income: yes  If yes, working activity level: Working Full Time  Pt reports is working full time at [employer] as  for 8 years. Pt reports having STD/LTD and PTO Pt reports has used STD in the past and is familiar with how it works at [employer].    Spouse/Significant Other Employment: pt reports is not     Disabled: pt denies    Monthly Income:  $4,000 earned income  Able to afford all costs now and if transplanted, including medications: yes  Patient and Caregiver verbalizes understanding of personal responsibilities related to transplant costs and the importance of having a financial plan to ensure that patients transplant costs are fully covered.       provided fundraising information/education. Patient and Caregiververbalizes understanding.   remains available.    Insurance:   Payer/Plan Subscr  Sex Relation Sub. Ins. ID Effective Group Num   1. BLUE CROSS BL* MANJULA PIKE 1970 Male Self [id number] 22 [id number]                                    [address] [address]     Primary Insurance (for UNOS reporting): Private Insurance BCBS through work  Secondary Insurance (for UNOS reporting): pt denies  Patient and Caregiver verbalizes clear understanding that patient may experience difficulty obtaining and/or be denied insurance coverage post-surgery. This includes and is not limited to disability insurance, life insurance, health insurance, burial insurance, long term care insurance, and other insurances.      Patient and Caregiver also reports understanding that future health concerns related to or unrelated to transplantation may not be covered by patient's insurance.  Resources and information provided and reviewed.     Patient and Caregiver provides verbal permission to release any necessary information to outside resources for patient care and discharge planning.  Resources and information provided are reviewed.      [facility], [phone]. PD    Dialysis Adherence: Patient and Caregiver reports high dialysis compliance with treatments and instructions within last 3 months.  Dialysis compliance update shows patient being highly compliant with treatments and instructions within last 3 months. Compliance update states patient has experienced anxiety with dialysis in the past. Pt placed on Zoloft 50 mg and Buspar 5 mg (prescribed by PCP) and reports is helping with depression and anxiety.    Infusion Service: patient utilizing? no  Home Health: patient utilizing? no  DME: yes PD equipment and supplies  Pulmonary/Cardiac Rehab: pt denies  ADLS:  independent    Adherence:   Pt reports high medical compliance with appointments and instructions within last 3 months.  Adherence education and counseling provided.     Per History Section:  Past Medical History:   Diagnosis Date    Disorder of kidney and ureter     Hypertension      Social History     Tobacco Use    Smoking status: Never    Smokeless tobacco: Never   Substance Use Topics    Alcohol use: Not  Currently     Social History     Substance and Sexual Activity   Drug Use Never     Social History     Substance and Sexual Activity   Sexual Activity Yes    Partners: Female       Per Today's Psychosocial:  Tobacco: none, patient denies any use.  Alcohol: none, patient denies any use.  Illicit Drugs/Non-prescribed Medications: none, patient denies any use.    Patient and Caregiver states clear understanding of the potential impact of substance use as it relates to transplant candidacy and is aware of possible random substance screening.  Substance abstinence/cessation counseling, education and resources provided and reviewed.     Arrests/DWI/Treatment/Rehab: patient denies    Psychiatric History:    Mental Health: Pt reports has experienced feelings of sadness and anxiety once starting dialysis. Pt reports discussed with PCP and was prescribed Buspar 5 mg and Zoloft 50 mg to help with mood. Pt reports Buspar and Zoloft is helping feel less depressed and anxious.  Psychiatrist/Counselor: pt denies  Medications:  Buspar 5 mg and Zoloft 50 mg  Suicide/Homicide Issues: pt denies any si/hi history  Safety at home: pt reports lives in safe home environment with no abuse.    Knowledge: Patient and Caregiver states having clear understanding and realistic expectations regarding the potential risks and potential benefits of organ transplantation and organ donation and agrees to discuss with health care team members and support system members, as well as to utilize available resources and express questions and/or concerns in order to further facilitate the pt informed decision-making.  Resources and information provided and reviewed.    Patient and Caregiver is aware of [facility] affiliation and/or partnership with agencies in home health care, LTAC, SNF, [facility], and other hospitals and clinics.    Understanding: Patient and Caregiver reports having a clear understanding of the many lifetime commitments involved with being a  transplant recipient, including costs, compliance, medications, lab work, procedures, appointments, concrete and financial planning, preparedness, timely and appropriate communication of concerns, abstinence (ETOH, tobacco, illicit non-prescribed drugs), adherence to all health care team recommendations, support system and caregiver involvement, appropriate and timely resource utilization and follow-through, mental health counseling as needed/recommended, and patient and caregiver responsibilities.  Social Service Handbook, resources and detailed educational information provided and reviewed.  Educational information provided.    Patient and Caregiver also reports current and expected compliance with health care regime and states having a clear understanding of the importance of compliance.      Patient and Caregiver reports a clear understanding that risks and benefits may be involved with organ transplantation and with organ donation.       Patient and Caregiver also reports clear understanding that psychosocial risk factors may affect patient, and include but are not limited to feelings of depression, generalized anxiety, anxiety regarding dependence on others, post traumatic stress disorder, feelings of guilt and other emotional and/or mental concerns, and/or exacerbation of existing mental health concerns.  Detailed resources provided and discussed.      Patient and Caregiver agrees to access appropriate resources in a timely manner as needed and/or as recommended, and to communicate concerns appropriately.  Patient and Caregiver also reports a clear understanding of treatment options available.     Patient and Caregiver received education in a group setting.   reviewed education, provided additional information, and answered questions.    Feelings or Concerns: Pt reports high motivation to pursue kidney organ transplant at this time.    Coping: Identify Patient & Caregiver Strategies to [place]:   1.  In the past, coping with major surgery and/or related stress - pt reports enjoys working full time; reports high family support; enjoys fishing and watching football on tv; [employer] is his favorite team   2. Currently & Pre-transplant - pt reports enjoys working full time; reports high family support; enjoys fishing and watching football on tv; [employer] is his favorite team   3. At the time of surgery - reports high family support    4. During post-Transplant & Recovery Period - reports high family support    Goals: Pt reports hope for successful kidney organ transplant so he can discontinue dialysis and have healthier life. Pt reports hope to return to work once recovered and cleared post transplant.  Patient referred to Vocational Rehabilitation.    Interview Behavior: Patient and Caregiver presents as alert and oriented x 4, pleasant, good eye contact, well groomed, recall good, concentration/judgement good, average intelligence, calm, communicative, cooperative, and asking and answering questions appropriately. Pt's highly supportive aunt Gricel in session with patient's permission.         Transplant Social Work - Candidacy  Assessment/Plan:     Psychosocial Suitability: Patient presents as low risk candidate for kidney transplant at this time. Based on psychosocial risk factors, patient presents as low risk due to patient denying psychosocial barriers to kidney organ transplant at this time. Pt reports having organ transplant caregiver/transportation plan, medical insurance plan and plan to afford transplant costs all in place.    Recommendations/Additional Comments: Pt reports plan to discuss with medical insurance if he has travel, meals and lodging benefits. Pt reports having STD/LTD and PTO through work. Pt reports plan to transplant fund raise for transplant costs.    SW recommends that pt conduct fundraising to assist pt with pay for cost of medications, food, gas, and other  transplant related needs.  SW recommends that pt remain aware of potential mental health concerns and contact the team if any concerns arise.  SW recommends that pt remain abstinent from tobacco, ETOH, and drug use.  SW supports pt's continued adherence. SW remains available to answer any questions or concerns that arise as the pt moves through the transplant process.      Final determination of transplant candidacy will be reviewed by the selection committee.      Angelika MARINW

## 2023-03-08 ENCOUNTER — DOCUMENTATION ONLY (OUTPATIENT)
Dept: TRANSPLANT | Facility: CLINIC | Age: 53
End: 2023-03-08
Payer: COMMERCIAL

## 2023-03-08 NOTE — PROGRESS NOTES
Phoned patient, informed of the need to have a CTSCAN of abdomen and pelvis without contrast to assess for aorta/iliac calcifications. Informed will mail order to address provided and fax to his referring provider and dialysis unit. Patient verbalized understanding of above.

## 2023-03-08 NOTE — LETTER
March 8, 2023    Yassine Cox  [address] [place] [address]             Dear Dr. Taiwo Lewis, Primary Doctor No    Patient: Yassine Cox   MR Number: [id number]   YOB: 1970     A battery of tests must be done to determine if you are in suitable health to undergo a kidney transplant.  All  the recommended studies must be completed and received by the transplant team before you can be presented to the transplant selection committee. Once all your evaluation is complete the committee will then decide if you are a suitable transplant candidate.  The following studies need to be obtained at home:    _X__CtScan Of Abdomen/Pelvis Without Contrast ICD-10 code N28.89. To assess aorta/iliac calcifications.    _X__Colonoscopy: This is a required screening test for all adults age 45 or above or those considered at risk for colon cancer. ICD-10 code Z12.11.    _X__Cardiac stress test: ICD-10 code N19. We request you to have a stress test to determine if you have any evidence of blockages in your heart.  We usually recommend a nuclear stress test or dobutamine stress echo, given most patients cannot walk on a treadmill long enough to achieve their target heart rate.     ___Cardiac PET Stress Test:  ICD-10 code Z91.89, Z01.810 and I 25.10 Age >50 and DM>10 yrs. Age > 50 and dialysis duration > 5 yrs. CAD, s/p PCI or CABG. If unable to obtain, please refer to nuclear stress test above.     __2-D echo with color flow doppler: ICD-10 code N19. We need to look at the heart valves and heart muscle, and determine pulmonary artery pressures.      _X__Cardiology consult: We are asking that your cardiologist clear you for transplant surgery and maximize your medical management.  We also need to note if there are special  management strategies that need to be used during your transplant event, especially since we routinely use IV fluids to help the new kidney function at its best.  Also, your heart doctor  needs to know that the average wait for a kidney transplant can be as long as 3-5 years.  Thus, we not only ask for a preoperative clearance, but also optimal management of your heart  (for example: lipids, high blood pressure, heart failure, etc.).    _X__ABO/BloodType: ICD-10 code N19    You and your doctor should feel free to contact us at any time, if there are questions or concerns about these tests or the transplant evaluation process.    Sincerely,    Vanessa Dixon MD  Medical Director, Kidney & Kidney/Pancreas Transplantation      [facility]  [address] [place] [address]  [phone]    Cc: Taiwo Lewis DO; [facility]

## 2023-05-16 ENCOUNTER — TELEPHONE (OUTPATIENT)
Dept: TRANSPLANT | Facility: CLINIC | Age: 53
End: 2023-05-16
Payer: COMMERCIAL

## 2023-05-16 NOTE — TELEPHONE ENCOUNTER
Reach out to the patient to inquire about the additional testing that he needs. He is currently being evaluated by [facility], in [place]. He has cardiac testing scheduled on May 25th. He is unsure if the CT AP that was requested is scheduled. He does not have colonoscopy scheduled yet. Given fax number so the results could be faxed over the [facility]. He will let us know when colonoscopy is scheduled and if he undergoes a CT scan.    Suzette De Luna

## 2023-06-14 ENCOUNTER — TELEPHONE (OUTPATIENT)
Dept: TRANSPLANT | Facility: CLINIC | Age: 53
End: 2023-06-14
Payer: COMMERCIAL

## 2023-06-14 NOTE — TELEPHONE ENCOUNTER
----- Message from Marco Mooney sent at 6/14/2023  9:47 AM CDT -----  Regarding: call back  Pt call to speak with  Noemí  in regards to a letter he received     Call

## 2023-06-19 ENCOUNTER — TELEPHONE (OUTPATIENT)
Dept: TRANSPLANT | Facility: CLINIC | Age: 53
End: 2023-06-19
Payer: COMMERCIAL

## 2023-06-19 NOTE — TELEPHONE ENCOUNTER
----- Message from Mary Griffin sent at 6/19/2023 12:49 PM CDT -----  Regarding: Patient advice  Contact: Pt [phone]                Name of Caller:Yassine Cox       Contact Preference: [phone]      Nature of Call: Patient requesting a call back to discuss getting records from [facility] to [facility] will assist with request she can be reached at   [phone]

## 2023-06-19 NOTE — TELEPHONE ENCOUNTER
Returned pt's call regarding outside testing. I informed pt that I've tried to contact Nithya at [facility] with no answer. I gave pt our fax number to give to her if he talks to her again to send his records. Pt hasn't had his colonoscopy yet.

## 2023-07-07 ENCOUNTER — TELEPHONE (OUTPATIENT)
Dept: TRANSPLANT | Facility: CLINIC | Age: 53
End: 2023-07-07
Payer: COMMERCIAL

## 2023-07-07 NOTE — TELEPHONE ENCOUNTER
Pt states he had his colonoscopy on yesterday at [facility] in [place]. I've requested those records.

## 2023-07-07 NOTE — TELEPHONE ENCOUNTER
----- Message from Mary Griffin sent at 7/7/2023 11:00 AM CDT -----  Regarding: Appt  Contact: Pt [phone]          Name of Caller: Yassine Cox       Contact Preference:  [phone]      Nature of Call: Patient requesting a call back to confirm records were received

## 2023-07-11 ENCOUNTER — TELEPHONE (OUTPATIENT)
Dept: TRANSPLANT | Facility: CLINIC | Age: 53
End: 2023-07-11
Payer: COMMERCIAL

## 2023-07-12 ENCOUNTER — TELEPHONE (OUTPATIENT)
Dept: TRANSPLANT | Facility: CLINIC | Age: 53
End: 2023-07-12
Payer: COMMERCIAL

## 2023-07-12 NOTE — TELEPHONE ENCOUNTER
MA notes per adherence form/Giovana (nurse) with [facility] # [phone] fax# [phone]    PT IS PD    FOR THE PAST THREE MONTHS:    0-AMA's  0-No-shows    No concerns with care giving, transportation, or mental health    Per Giovana (nurse) pt is very compliant.  Pt started PD home therapy on 01/13/2023. Pt decided to do PD Home due to working full time.     Brought over to clinic to be scanned in.    Radha Leo  Abdominal Transplant MA

## 2023-07-14 ENCOUNTER — COMMITTEE REVIEW (OUTPATIENT)
Dept: TRANSPLANT | Facility: CLINIC | Age: 53
End: 2023-07-14
Payer: COMMERCIAL

## 2023-07-14 ENCOUNTER — TELEPHONE (OUTPATIENT)
Dept: TRANSPLANT | Facility: CLINIC | Age: 53
End: 2023-07-14
Payer: COMMERCIAL

## 2023-07-14 NOTE — LETTER
July 14, 2023    Taiwo Lewis MD  [address] [place] [address]  Phone: [phone]  Fax: [phone]     Dear Dr. Lewis:    Patient: Yassine Cox   MR Number: [id number]   YOB: 1970     Your patient, Yassine Cox, was recently discussed at the [facility] Selection Committee meeting on 7/14/2023. I am happy to inform you that Yassine has been approved for transplantation.  He has met selection criteria for a kidney transplant related to ESRD secondary to primary diagnosis of Hypertensive Nephrosclerosis. Your patient will be placed on the cadaveric wait list pending final financial approval from insurance company.     We appreciate your confidence in allowing us to participate in your patients care.  If you have any questions or concerns, please do not hesitate to contact me.    Sincerely,      Vanessa Dixon MD  Medical Director, Kidney & Kidney/Pancreas Transplantation    CC:  Bismark Cox (patient)          [facility]

## 2023-07-14 NOTE — COMMITTEE REVIEW
Native Organ Dx: Hypertensive Nephrosclerosis      SELECTION COMMITTEE NOTE    Yassine Cox was presented at selection committee on .  Patient met selection criteria for kidney transplant related to ESRD due to   Hypertensive Nephrosclerosis.  No absolute contraindications to transplant at this time.  Patient will be placed on the cadaveric wait list pending final financial approval from insurance company.  Patient will return to clinic for routine appointment in 1 year(s). Patient meets criteria for High KDPI kidney offer. Patient meets HCV+ kidney offer. Patient meets criteria for dual/enbloc.      Note written by Sanjuana Cox RN    ===============================================  I was present at the meeting and attest to the decision of the committee.    Ping Rubio, DO  Transplant Nephrology

## 2023-07-14 NOTE — TELEPHONE ENCOUNTER
----- Message from Dona López sent at 7/14/2023  1:09 PM CDT -----  Regarding: return missed call  The patient called requesting to speak to Kiki - returning call from voice mail    No further information provided      Patient can be contacted @# [phone] (home)

## 2023-07-17 ENCOUNTER — TELEPHONE (OUTPATIENT)
Dept: TRANSPLANT | Facility: CLINIC | Age: 53
End: 2023-07-17
Payer: COMMERCIAL

## 2023-07-17 NOTE — PROGRESS NOTES
Message from Dona López sent at 7/14/2023  1:09 PM CDT -----  Regarding: return missed call  The patient called requesting to speak to Kiki - returning call from voice mail     No further information provided        Patient can be contacted @# [phone] (home)

## 2023-07-28 ENCOUNTER — TELEPHONE (OUTPATIENT)
Dept: TRANSPLANT | Facility: CLINIC | Age: 53
End: 2023-07-28
Payer: COMMERCIAL

## 2023-07-28 NOTE — PROGRESS NOTES
"  KIDNEY WAIT LISTING NOTE    Date of Financial clearance to list: 23    [facility]:     Organ: Kidney    Last Name: Brooke  First Name: Yassine    : 1970       Gender: male        MRN#: [id number]                                   State of Permanent Residence: [address] [place] [id number]    Ethnicity: Not  or /a   Race:      Black or     CLINICAL INFORMATION   Candidate Medical Urgency Status: Active (1)  Number of Previous Kidney Transplants: 0  Number of Previous Solid Organ Transplants: 0  Did you enter number of previous kidney or other solid organ transplants? Yes  Is this Candidate a Prior Living Donor: No  (If yes, please generate letter to UNOS with patient's date of donation, recipient SSN, signed by Surgical Director after patient is listed in order to receive priority points).      ABO  ABO Blood Group:   A POS     ABO Confirmation: (THESE DATES MUST BE PRIOR TO THE LIST DATE AND SUPPORTED BY SEPARATE LAB REPORTS)    Internal Results    Lab Results   Component Value Date    GROUPTRH A POS 2023     No results found for: ABO    External Results    ABO Date 1: 22   ABO Date 2  Are either of these ABO results based on External Labs? Yes  (If Yes, STOP and go to source document in Media Tab for verification).    VITALS  Height:  5' 8.39"  Weight:  92.9kg  (Use height from Transplant clinic visits only).  Did you enter height/weight? Yes    HLA    Class I:  Lab Results   Component Value Date    PAAN9WM 3 2023    EAWY3WD XX 2023    UDVZ9ZV 71 2023    FCSZ5GS 53 2023    KYSPH6JM 6 2023    BQUBJ0WG 4 2023    AVONT6CF 10 2023    OADAT0QS 4 2023       Class II:  Lab Results   Component Value Date    UWSVRP56FC 10 2023    RLLLMA34PL 13 2023    LPRMBA507KV XX 2023    BQGLDG6199 52 2023    HXDWC1ZA 5 2023    HSDGA2GF 2 2023       Tested for HLA Antibodies:  Yes .  No " "antibodies  detected.     If result is "Positive" antibodies are detected     If result is "Negative or questionable" no antibodies detected    Lab Results   Component Value Date    CIPRAS Negative 03/01/2023    CIIPRAS Negative 03/01/2023       DIALYSIS INFORMATION  Is patient Pre-Dialysis: No     GFR Information  Report GFR being used as the criteria for placement on the kidney list. If not, leave blank  GFR < or = 20 ml/min? n/a  If Yes, Specify value  ___   ml/min     Initial date GFR became 20 or less:   Is GFR obtained from an Outside lab Result? n/a  (If YES verify with source document scanned into media)    If patient on Dialysis:    Is candidate currently on dialysis for ESRD? Yes  If Yes,  Date Chronic Dialysis Started:   2/12/2022  (verify with source document in Media Tab)   Dialysis Unit Name: [facility]  [address] [facility] [address]                        Physician Name:  Dr. Vanessa Jernigan  NPI#: [id number]    DIABETES INFORMATION  Primary Native Kidney Diagnosis: Hypertensive Nephrosclerosis  C-Peptide Value - No results found for: CPEPTIDE  Current Diabetes Status: None    FOR NON-KIDNEY DEPARTMENT USE ONLY:  Additional Organs Registered? none    Maximum Acceptable Number of HLA Mismatches  ABDR:     6      (0-6)               AB:               (0-4)  ADR:   _____  (0-4)              BDR: _____ (0-4)  A:        _____  (0-2)              B:      _____ (0-2)          DR: ______ (0-2)    Will Recipient Accept?   Accept HBcAB Positive Organ:            Yes  Accept HBV BERTHA Positive Organ:        No  Accept HCV Antibody Positive Organ: Yes   Accept HCV BERTHA Positive Organ: Yes    Dual Kidney and En Bloc Opt In : No  Dual  Local:   No  Dual Import:   No  En Bloc Local:   No  En Bloc Import: No     Accept KDPI > 85: Single: No     Local: No     Import: No  Accept KDPI > 85: Dual: No     Local: No     Import: No    ### NURSE TO VERIFY CONSENT AND MAKE ANY NECESSARY CHANGES NEEDED IN UNET AT " THE TIME OF VERIFICATION ###    Unacceptible Antigens  If yes, list     No results found for: EU6WWJY, CIABCLM, CIIAB    ### DO NOT LIST IF ANTIGEN VALUE WEAK ###    Hep B Vaccine series completed: yes    Blood Type x2 was verified by myself and (type in Name of second coordinator).  Blood type determination and reporting was completed according to the programs protocols and OPTN requirements.

## 2023-07-31 ENCOUNTER — TELEPHONE (OUTPATIENT)
Dept: TRANSPLANT | Facility: CLINIC | Age: 53
End: 2023-07-31
Payer: COMMERCIAL

## 2023-08-02 ENCOUNTER — TELEPHONE (OUTPATIENT)
Dept: TRANSPLANT | Facility: CLINIC | Age: 53
End: 2023-08-02
Payer: COMMERCIAL

## 2023-08-02 ENCOUNTER — EPISODE CHANGES (OUTPATIENT)
Dept: TRANSPLANT | Facility: CLINIC | Age: 53
End: 2023-08-02

## 2023-08-02 DIAGNOSIS — Z76.82 ORGAN TRANSPLANT CANDIDATE: ICD-10-CM

## 2023-08-02 DIAGNOSIS — Z76.82 AWAITING ORGAN TRANSPLANT STATUS: Primary | ICD-10-CM

## 2023-08-02 PROCEDURE — 86833 HLA CLASS II HIGH DEFIN QUAL: CPT | Mod: PO,TXP | Performed by: NURSE PRACTITIONER

## 2023-08-02 PROCEDURE — 86832 HLA CLASS I HIGH DEFIN QUAL: CPT | Mod: PO,TXP | Performed by: NURSE PRACTITIONER

## 2023-08-02 NOTE — PROGRESS NOTES
Spoke to pt, confirmed that we have received financial approval from insurance company and he is now active on kidney transplant waitlist effective 7/28/23.  Confirmed current dialysis clinic, [facility] [place] on [address], ph#[phone].  Called the clinic to confirm that they have [facility] HLA blood tubes and they will draw HLA blood monthly, they will be drawing monthly labs this week and will collect his sample and send here.  Notified HLA lab to send monthly tubes.

## 2023-08-08 ENCOUNTER — LAB VISIT (OUTPATIENT)
Dept: LAB | Facility: HOSPITAL | Age: 53
End: 2023-08-08
Payer: COMMERCIAL

## 2023-08-08 DIAGNOSIS — Z76.82 AWAITING ORGAN TRANSPLANT STATUS: ICD-10-CM

## 2023-09-05 LAB — HPRA INTERPRETATION: NORMAL

## 2023-09-20 PROCEDURE — 99001 SPECIMEN HANDLING PT-LAB: CPT | Mod: PO,TXP | Performed by: NURSE PRACTITIONER

## 2023-09-25 ENCOUNTER — LAB VISIT (OUTPATIENT)
Dept: LAB | Facility: HOSPITAL | Age: 53
End: 2023-09-25
Payer: COMMERCIAL

## 2023-09-25 DIAGNOSIS — Z76.82 AWAITING ORGAN TRANSPLANT STATUS: ICD-10-CM

## 2023-09-26 PROCEDURE — 86832 HLA CLASS I HIGH DEFIN QUAL: CPT | Mod: PO,TXP | Performed by: NURSE PRACTITIONER

## 2023-09-26 PROCEDURE — 86833 HLA CLASS II HIGH DEFIN QUAL: CPT | Mod: PO,TXP | Performed by: NURSE PRACTITIONER

## 2023-09-26 PROCEDURE — 86977 RBC SERUM PRETX INCUBJ/INHIB: CPT | Mod: PO,TXP | Performed by: NURSE PRACTITIONER

## 2023-10-03 LAB
HLA FREEZE AND HOLD INTERPRETATION: NORMAL
HLAFH COLLECTION DATE: NORMAL
HPRA INTERPRETATION: NORMAL

## 2023-10-04 PROCEDURE — 86833 HLA CLASS II HIGH DEFIN QUAL: CPT | Mod: PO,TXP | Performed by: NURSE PRACTITIONER

## 2023-10-04 PROCEDURE — 86832 HLA CLASS I HIGH DEFIN QUAL: CPT | Mod: PO,TXP | Performed by: NURSE PRACTITIONER

## 2023-10-06 PROCEDURE — 86832 HLA CLASS I HIGH DEFIN QUAL: CPT | Mod: PO,TXP | Performed by: NURSE PRACTITIONER

## 2023-10-06 PROCEDURE — 86833 HLA CLASS II HIGH DEFIN QUAL: CPT | Mod: PO,TXP | Performed by: NURSE PRACTITIONER

## 2023-10-10 ENCOUNTER — LAB VISIT (OUTPATIENT)
Dept: LAB | Facility: HOSPITAL | Age: 53
End: 2023-10-10
Payer: COMMERCIAL

## 2023-10-10 DIAGNOSIS — Z76.82 AWAITING ORGAN TRANSPLANT STATUS: ICD-10-CM

## 2023-10-10 LAB
CLASS I ANTIBODY COMMENTS - LUMINEX: NORMAL
CLASS I ANTIBODY COMMENTS - LUMINEX: NORMAL
CLASS II ANTIBODY COMMENTS - LUMINEX: NORMAL
CLASS II ANTIBODY COMMENTS - LUMINEX: NORMAL
SERUM COLLECTION DT - LUMINEX CLASS I: NORMAL
SERUM COLLECTION DT - LUMINEX CLASS I: NORMAL
SERUM COLLECTION DT - LUMINEX CLASS II: NORMAL
SERUM COLLECTION DT - LUMINEX CLASS II: NORMAL
SLAA1 TESTING DATE: NORMAL
SLAA2 TESTING DATE: NORMAL
SPCL1 TESTING DATE: NORMAL
SPCL2 TESTING DATE: NORMAL
SPCLU TESTING DATE: NORMAL
SPLAA TESTING DATE: NORMAL

## 2023-10-26 LAB — HPRA INTERPRETATION: NORMAL

## 2023-10-31 PROCEDURE — 86977 RBC SERUM PRETX INCUBJ/INHIB: CPT | Mod: PO,TXP | Performed by: NURSE PRACTITIONER

## 2023-10-31 PROCEDURE — 86833 HLA CLASS II HIGH DEFIN QUAL: CPT | Mod: PO,TXP | Performed by: NURSE PRACTITIONER

## 2023-10-31 PROCEDURE — 86832 HLA CLASS I HIGH DEFIN QUAL: CPT | Mod: PO,TXP | Performed by: NURSE PRACTITIONER

## 2023-11-08 PROCEDURE — 99001 SPECIMEN HANDLING PT-LAB: CPT | Mod: PO,TXP | Performed by: NURSE PRACTITIONER

## 2023-11-10 ENCOUNTER — LAB VISIT (OUTPATIENT)
Dept: LAB | Facility: HOSPITAL | Age: 53
End: 2023-11-10
Payer: COMMERCIAL

## 2023-11-10 DIAGNOSIS — Z76.82 AWAITING ORGAN TRANSPLANT STATUS: ICD-10-CM

## 2023-12-08 PROCEDURE — 86977 RBC SERUM PRETX INCUBJ/INHIB: CPT | Mod: PO,TXP | Performed by: NURSE PRACTITIONER

## 2023-12-08 PROCEDURE — 86832 HLA CLASS I HIGH DEFIN QUAL: CPT | Mod: PO,TXP | Performed by: NURSE PRACTITIONER

## 2023-12-08 PROCEDURE — 86833 HLA CLASS II HIGH DEFIN QUAL: CPT | Mod: PO,TXP | Performed by: NURSE PRACTITIONER

## 2023-12-16 PROCEDURE — 86832 HLA CLASS I HIGH DEFIN QUAL: CPT | Mod: PO,TXP | Performed by: NURSE PRACTITIONER

## 2023-12-16 PROCEDURE — 86833 HLA CLASS II HIGH DEFIN QUAL: CPT | Mod: PO,TXP | Performed by: NURSE PRACTITIONER

## 2023-12-19 ENCOUNTER — LAB VISIT (OUTPATIENT)
Dept: LAB | Facility: HOSPITAL | Age: 53
End: 2023-12-19
Payer: COMMERCIAL

## 2023-12-19 DIAGNOSIS — Z76.82 AWAITING ORGAN TRANSPLANT STATUS: ICD-10-CM

## 2023-12-26 DIAGNOSIS — Z76.82 ORGAN TRANSPLANT CANDIDATE: Primary | ICD-10-CM

## 2024-01-06 ENCOUNTER — LAB VISIT (OUTPATIENT)
Dept: LAB | Facility: HOSPITAL | Age: 54
End: 2024-01-06
Payer: COMMERCIAL

## 2024-01-06 DIAGNOSIS — Z76.82 ORGAN TRANSPLANT CANDIDATE: ICD-10-CM

## 2024-01-19 ENCOUNTER — TELEPHONE (OUTPATIENT)
Dept: TRANSPLANT | Facility: CLINIC | Age: 54
End: 2024-01-19
Payer: COMMERCIAL

## 2024-01-19 NOTE — TELEPHONE ENCOUNTER
I have attempted without success to contact this patient by phone to schedule appt for annual test and clinic visit. Message was left to return call.

## 2024-01-22 ENCOUNTER — TELEPHONE (OUTPATIENT)
Dept: TRANSPLANT | Facility: CLINIC | Age: 54
End: 2024-01-22
Payer: COMMERCIAL

## 2024-01-22 NOTE — TELEPHONE ENCOUNTER
Spoke to pt confirming appts on 03/22/2024. Appt reminders were mailed on 01/22/2024 and pt is aware to bring care giver.

## 2024-02-06 LAB — HPRA INTERPRETATION: NORMAL

## 2024-02-06 PROCEDURE — 86832 HLA CLASS I HIGH DEFIN QUAL: CPT | Mod: TXP | Performed by: NURSE PRACTITIONER

## 2024-02-06 PROCEDURE — 86833 HLA CLASS II HIGH DEFIN QUAL: CPT | Mod: TXP | Performed by: NURSE PRACTITIONER

## 2024-02-12 PROCEDURE — 86833 HLA CLASS II HIGH DEFIN QUAL: CPT | Mod: TXP | Performed by: NURSE PRACTITIONER

## 2024-02-12 PROCEDURE — 86832 HLA CLASS I HIGH DEFIN QUAL: CPT | Mod: TXP | Performed by: NURSE PRACTITIONER

## 2024-02-14 ENCOUNTER — TELEPHONE (OUTPATIENT)
Dept: TRANSPLANT | Facility: CLINIC | Age: 54
End: 2024-02-14
Payer: COMMERCIAL

## 2024-02-14 NOTE — TELEPHONE ENCOUNTER
MA notes per Adherence form     PD PT    FOR THE PAST THREE MONTHS:    0-AMA's  0-No-shows    No concerns with care giving, transportation, or mental health    Scanned in pt's media    Radha Leo  Abdominal Transplant MA

## 2024-02-16 ENCOUNTER — LAB VISIT (OUTPATIENT)
Dept: LAB | Facility: HOSPITAL | Age: 54
End: 2024-02-16
Payer: COMMERCIAL

## 2024-02-16 DIAGNOSIS — Z76.82 AWAITING ORGAN TRANSPLANT STATUS: ICD-10-CM

## 2024-02-20 PROCEDURE — 99001 SPECIMEN HANDLING PT-LAB: CPT | Mod: PO,TXP | Performed by: NURSE PRACTITIONER

## 2024-03-06 LAB — HPRA INTERPRETATION: NORMAL

## 2024-03-06 PROCEDURE — 99001 SPECIMEN HANDLING PT-LAB: CPT | Mod: PO,TXP | Performed by: NURSE PRACTITIONER

## 2024-03-09 ENCOUNTER — LAB VISIT (OUTPATIENT)
Dept: LAB | Facility: HOSPITAL | Age: 54
End: 2024-03-09
Payer: COMMERCIAL

## 2024-03-09 DIAGNOSIS — Z76.82 AWAITING ORGAN TRANSPLANT STATUS: ICD-10-CM

## 2024-03-17 LAB
CLASS I ANTIBODY COMMENTS - LUMINEX: NORMAL
CLASS II ANTIBODIES - LUMINEX: NEGATIVE
CPRA %: 0
SERUM COLLECTION DT - LUMINEX CLASS I: NORMAL
SERUM COLLECTION DT - LUMINEX CLASS II: NORMAL
SPIM1 TESTING DATE: NORMAL
SPIM2 TESTING DATE: NORMAL
SPLIM TESTING DATE: NORMAL

## 2024-03-22 ENCOUNTER — HOSPITAL ENCOUNTER (OUTPATIENT)
Dept: RADIOLOGY | Facility: HOSPITAL | Age: 54
Discharge: HOME | End: 2024-03-22
Attending: NURSE PRACTITIONER
Payer: COMMERCIAL

## 2024-03-22 ENCOUNTER — OFFICE VISIT (OUTPATIENT)
Dept: TRANSPLANT | Facility: CLINIC | Age: 54
End: 2024-03-22
Payer: COMMERCIAL

## 2024-03-22 ENCOUNTER — HOSPITAL ENCOUNTER (OUTPATIENT)
Dept: CARDIOLOGY | Facility: HOSPITAL | Age: 54
Discharge: HOME | End: 2024-03-22
Attending: NURSE PRACTITIONER
Payer: COMMERCIAL

## 2024-03-22 ENCOUNTER — TELEPHONE (OUTPATIENT)
Dept: TRANSPLANT | Facility: CLINIC | Age: 54
End: 2024-03-22
Payer: COMMERCIAL

## 2024-03-22 VITALS
TEMPERATURE: 97 F | WEIGHT: 187.63 LBS | DIASTOLIC BLOOD PRESSURE: 91 MMHG | SYSTOLIC BLOOD PRESSURE: 163 MMHG | BODY MASS INDEX: 28.44 KG/M2 | HEART RATE: 82 BPM | HEIGHT: 68 IN | OXYGEN SATURATION: 99 %

## 2024-03-22 VITALS
SYSTOLIC BLOOD PRESSURE: 142 MMHG | HEIGHT: 68 IN | HEART RATE: 78 BPM | BODY MASS INDEX: 31.04 KG/M2 | WEIGHT: 204.81 LBS | DIASTOLIC BLOOD PRESSURE: 102 MMHG

## 2024-03-22 DIAGNOSIS — Z99.2 ESRD ON DIALYSIS: Primary | ICD-10-CM

## 2024-03-22 DIAGNOSIS — E21.3 HPTH (HYPERPARATHYROIDISM): ICD-10-CM

## 2024-03-22 DIAGNOSIS — I50.20 HEART FAILURE WITH REDUCED EJECTION FRACTION: ICD-10-CM

## 2024-03-22 DIAGNOSIS — Z76.82 ORGAN TRANSPLANT CANDIDATE: ICD-10-CM

## 2024-03-22 DIAGNOSIS — Z01.818 PRE-TRANSPLANT EVALUATION FOR KIDNEY TRANSPLANT: ICD-10-CM

## 2024-03-22 DIAGNOSIS — I10 ESSENTIAL HYPERTENSION: ICD-10-CM

## 2024-03-22 DIAGNOSIS — N18.6 ESRD ON DIALYSIS: Primary | ICD-10-CM

## 2024-03-22 DIAGNOSIS — F32.A DEPRESSION, UNSPECIFIED DEPRESSION TYPE: ICD-10-CM

## 2024-03-22 LAB
ASCENDING AORTA: 3.39 CM
AV INDEX (PROSTH): 0.73
AV MEAN GRADIENT: 2 MMHG
AV PEAK GRADIENT: 4 MMHG
AV VALVE AREA BY VELOCITY RATIO: 4.1 CM²
AV VALVE AREA: 3.71 CM²
AV VELOCITY RATIO: 0.81
BSA FOR ECHO PROCEDURE: 2.11 M2
CV ECHO LV RWT: 0.33 CM
DOP CALC AO PEAK VEL: 0.95 M/S
DOP CALC AO VTI: 19.31 CM
DOP CALC LVOT AREA: 5.1 CM2
DOP CALC LVOT DIAMETER: 2.54 CM
DOP CALC LVOT PEAK VEL: 0.77 M/S
DOP CALC LVOT STROKE VOLUME: 71.56 CM3
DOP CALCLVOT PEAK VEL VTI: 14.13 CM
E WAVE DECELERATION TIME: 186.43 MSEC
E/A RATIO: 0.45
E/E' RATIO: 5.83 M/S
ECHO LV POSTERIOR WALL: 0.9 CM (ref 0.6–1.1)
EJECTION FRACTION: 40 %
FRACTIONAL SHORTENING: 31 % (ref 28–44)
INTERVENTRICULAR SEPTUM: 1 CM (ref 0.6–1.1)
IVRT: 131.3 MSEC
LA MAJOR: 5.24 CM
LA MINOR: 5.21 CM
LA WIDTH: 3.81 CM
LEFT ATRIUM SIZE: 3.47 CM
LEFT ATRIUM VOLUME INDEX MOD: 21.8 ML/M2
LEFT ATRIUM VOLUME INDEX: 28.5 ML/M2
LEFT ATRIUM VOLUME MOD: 44.81 CM3
LEFT ATRIUM VOLUME: 58.72 CM3
LEFT INTERNAL DIMENSION IN SYSTOLE: 3.8 CM (ref 2.1–4)
LEFT VENTRICLE DIASTOLIC VOLUME INDEX: 54.2 ML/M2
LEFT VENTRICLE DIASTOLIC VOLUME: 111.65 ML
LEFT VENTRICLE MASS INDEX: 97 G/M2
LEFT VENTRICLE SYSTOLIC VOLUME INDEX: 26.8 ML/M2
LEFT VENTRICLE SYSTOLIC VOLUME: 55.3 ML
LEFT VENTRICULAR INTERNAL DIMENSION IN DIASTOLE: 5.5 CM (ref 3.5–6)
LEFT VENTRICULAR MASS: 199.32 G
LV LATERAL E/E' RATIO: 5 M/S
LV SEPTAL E/E' RATIO: 7 M/S
MV A" WAVE DURATION": 12.84 MSEC
MV PEAK A VEL: 0.78 M/S
MV PEAK E VEL: 0.35 M/S
MV STENOSIS PRESSURE HALF TIME: 54.07 MS
MV VALVE AREA P 1/2 METHOD: 4.07 CM2
PISA TR MAX VEL: 2.3 M/S
PULM VEIN S/D RATIO: 2.17
PV PEAK D VEL: 0.24 M/S
PV PEAK S VEL: 0.52 M/S
RA MAJOR: 4.12 CM
RA PRESSURE ESTIMATED: 3 MMHG
RA WIDTH: 3.61 CM
RIGHT VENTRICULAR END-DIASTOLIC DIMENSION: 2.81 CM
RV TB RVSP: 5 MMHG
SINUS: 3.47 CM
STJ: 2.96 CM
TDI LATERAL: 0.07 M/S
TDI SEPTAL: 0.05 M/S
TDI: 0.06 M/S
TR MAX PG: 21 MMHG
TRICUSPID ANNULAR PLANE SYSTOLIC EXCURSION: 1.14 CM
TV REST PULMONARY ARTERY PRESSURE: 24 MMHG
Z-SCORE OF LEFT VENTRICULAR DIMENSION IN END DIASTOLE: -1.23
Z-SCORE OF LEFT VENTRICULAR DIMENSION IN END SYSTOLE: -0.02

## 2024-03-22 PROCEDURE — 3077F SYST BP >= 140 MM HG: CPT | Mod: CPTII,S$GLB,TXP, | Performed by: STUDENT IN AN ORGANIZED HEALTH CARE EDUCATION/TRAINING PROGRAM

## 2024-03-22 PROCEDURE — 3066F NEPHROPATHY DOC TX: CPT | Mod: CPTII,S$GLB,TXP, | Performed by: STUDENT IN AN ORGANIZED HEALTH CARE EDUCATION/TRAINING PROGRAM

## 2024-03-22 PROCEDURE — 99999 PR PBB SHADOW E&M-EST. PATIENT-LVL III: CPT | Mod: PBBFAC,TXP,, | Performed by: STUDENT IN AN ORGANIZED HEALTH CARE EDUCATION/TRAINING PROGRAM

## 2024-03-22 PROCEDURE — 76770 US EXAM ABDO BACK WALL COMP: CPT | Mod: 26,TXP,, | Performed by: STUDENT IN AN ORGANIZED HEALTH CARE EDUCATION/TRAINING PROGRAM

## 2024-03-22 PROCEDURE — 93306 TTE W/DOPPLER COMPLETE: CPT | Mod: 26,TXP,, | Performed by: INTERNAL MEDICINE

## 2024-03-22 PROCEDURE — 93306 TTE W/DOPPLER COMPLETE: CPT | Mod: TXP

## 2024-03-22 PROCEDURE — 99215 OFFICE O/P EST HI 40 MIN: CPT | Mod: S$GLB,TXP,, | Performed by: STUDENT IN AN ORGANIZED HEALTH CARE EDUCATION/TRAINING PROGRAM

## 2024-03-22 PROCEDURE — 1160F RVW MEDS BY RX/DR IN RCRD: CPT | Mod: CPTII,S$GLB,TXP, | Performed by: STUDENT IN AN ORGANIZED HEALTH CARE EDUCATION/TRAINING PROGRAM

## 2024-03-22 PROCEDURE — 71046 X-RAY EXAM CHEST 2 VIEWS: CPT | Mod: 26,TXP,, | Performed by: RADIOLOGY

## 2024-03-22 PROCEDURE — 76770 US EXAM ABDO BACK WALL COMP: CPT | Mod: TC,TXP

## 2024-03-22 PROCEDURE — 3080F DIAST BP >= 90 MM HG: CPT | Mod: CPTII,S$GLB,TXP, | Performed by: STUDENT IN AN ORGANIZED HEALTH CARE EDUCATION/TRAINING PROGRAM

## 2024-03-22 PROCEDURE — 99417 PROLNG OP E/M EACH 15 MIN: CPT | Mod: S$GLB,TXP,, | Performed by: STUDENT IN AN ORGANIZED HEALTH CARE EDUCATION/TRAINING PROGRAM

## 2024-03-22 PROCEDURE — 4010F ACE/ARB THERAPY RXD/TAKEN: CPT | Mod: CPTII,S$GLB,TXP, | Performed by: STUDENT IN AN ORGANIZED HEALTH CARE EDUCATION/TRAINING PROGRAM

## 2024-03-22 PROCEDURE — 71046 X-RAY EXAM CHEST 2 VIEWS: CPT | Mod: TC,TXP

## 2024-03-22 PROCEDURE — 1159F MED LIST DOCD IN RCRD: CPT | Mod: CPTII,S$GLB,TXP, | Performed by: STUDENT IN AN ORGANIZED HEALTH CARE EDUCATION/TRAINING PROGRAM

## 2024-03-22 PROCEDURE — 3008F BODY MASS INDEX DOCD: CPT | Mod: CPTII,S$GLB,TXP, | Performed by: STUDENT IN AN ORGANIZED HEALTH CARE EDUCATION/TRAINING PROGRAM

## 2024-03-22 NOTE — PROGRESS NOTES
Transplant Psychosocial Evaluation Update:  Last psychosocial evaluation for transplant was completed on 3/1/2023 by DEBBIE Valdez LCSW.    Pt presents today in company of Pritesh Horan (brother) and Gricel Messer (aunt). Pt and caregivers present as alert, oriented to person, place, time, purpose of visit. Pt and caregivers deny concerns about going through with transplant and state motivation and sense of preparedness for transplantation, caregiver role, psychosocial risk factors, medical risk factors, financial aspects, and lifetime commitments. All concerns and education points as per transplant psychosocial evaluation process addressed (also refer to psychosocial dated 3/1/2023). Pt actively participated in today's interview, with Pritesh Horan and Gricel Messer, participating as caregiver. Pt asking questions appropriately and denies changes to previous psychosocial evaluation for transplantation which we reviewed line by line with pt and, per pt choice, with pts caregiver today.    Primary Caregivers and Transportation for Transplant:  1. Gricel Messer ([phone]) 58 y/o aunt; resides in [place], [place] and drives.   2. Pritesh Horan ([phone]) 42 y/o brother; resides in [place] and drives.  3. Noemi Cox ([phone]) 70 y/o aunt; resides in [place], [place] and drives.  4. Ana Cox ([phone]) 77 y/o mother; resides in [place], [place] and drives.    Both pt and caregiver/s plan to stay locally at lodging arranged by themselves - information reviewed in depth. Caregivers plan to stay at hospital as appropriate for transplant and post-transplant process.    Pts Vocation: Pt works at [employer] as a  (9 years).    DME: PD equipment and supplies.     ADLS:  Pt reports independent with all ADLS, drives, and handles own medication management.       Household and Dependents: pt resides alone and has no dependents at this time.    Income: Pt states ability to afford all monthly expenses  and costs including for medications now and if transplanted based on income and on savings and assets.    Dialysis Adherence: DU reports 0 AMA's and 0 no-shows (see 2/14/2024 note by GEOVANNI Kate MA).    Pt and caregivers deny any additional concerns, stating preparedness and motivation to proceed with organ transplant.     Suitability for Transplant:   Pt remains suitable for transplant at this time based on psychosocial risk factors and strengths. Pt and caregivers present without acute mental health concerns.    Pt jacinto well overall with health needs and life in general, has stable supports, adequate insurance, financial stability, transportation and caregiver plans in place, and is using no substances unless prescribed.

## 2024-03-22 NOTE — PROGRESS NOTES
AA YEARLY PATIENT EDUCATION NOTE    Mr. Yassine Cox was seen in pre-kidney transplant clinic for yearly (or six months) evaluation for kidney, kidney/pancreas or pancreas only transplant.  The patient attended a web based education session that discussed/reviewed the following aspects of transplantation: [facility] waitlist management/multiple listings, types of organs offered (KDPI < 85%, KDPI > 85%, PHS increased risk, DCD, HCV+), financial aspects, surgical procedures, dietary instruction pre- and post-transplant, health maintenance pre- and post-transplant, post-transplant hospitalization and outpatient follow-up, potential to participate in a research protocol, and medication management and side effects. A question and answer session was provided after the presentation.    The patient was seen by all members of the multi-disciplinary team to include: Nephrologist/PA, , , Pharmacist and Dietician (if applicable).    The Patient was educated on OPTN policy change regarding race based eGFR. For Black or  Americans, this eGFR could have shown that their kidneys were working better than they were.    Because of this change, we are looking at everyones record and assessing waiting time for people who are eligible. We will be reviewing your medical records and will notify you if you are eligible. We also encouraged patient to provide span 20 labs that are not in our electronic medical records.    The patient reviewed and signed all consents for evaluation which were witnessed and sent to scanning into the [facility] chart.    The patient was given an education book and plan for further evaluation based on his individual assessment.      The patient was encouraged to call with any questions or concerns.

## 2024-03-22 NOTE — LETTER
March 22, 2024        Taiwo Lewis  [address] [place] [address]  Phone: [phone]  Fax: [phone]             [facility]- Transplant 1st Fl  [address]  [facility] [address]  Phone: [phone]   Patient: Yassine Cox   MR Number: [id number]   YOB: 1970   Date of Visit: 3/22/2024       Dear Dr. Taiwo Lewis    Thank you for referring Yassine Cox to me for evaluation. Attached you will find relevant portions of my assessment and plan of care.    If you have questions, please do not hesitate to call me. I look forward to following Yassine Cox along with you.    Sincerely,    Lee Watson Jr., MD    Enclosure    If you would like to receive this communication electronically, please contact [email or web address] or [phone] to request [employer] [facility] access.    [employer] Link is a tool which provides read-only access to select patient information with whom you have a relationship. Its easy to use and provides real time access to review your patients record including encounter summaries, notes, results, and demographic information.    If you feel you have received this communication in error or would no longer like to receive these types of communications, please e-mail [email or web address]

## 2024-03-22 NOTE — TELEPHONE ENCOUNTER
----- Message from Suzette De Luna NP sent at 3/22/2024 12:28 PM CDT -----  EF 40%  Previously 50-55%    Needs to seen cardiology  Send results to nephrology for management

## 2024-03-22 NOTE — PROGRESS NOTES
Transplant Nephrology  Kidney Transplant Recipient Evaluation    Referring Physician: Taiwo Lewis  Current Nephrologist: Taiwo Lewis    CC:   Initial evaluation of kidney transplant candidacy.  History of Present Illness    Patient presents for follow-up related to kidney transplantation suitability.    Patient performs peritoneal dialysis at home, usually during sleep and has been doing this effectively since 2021. He reports non-specific instances of low blood pressure during dialysis sessions approximately once or twice per week and manages these by adjusting the volume of his dialysis treatment. No significant complications associated with his at-home dialysis routine, no symptoms of fluid overload, such as swelling in the legs, and no hospitalizations within the past six months reported. He is under consideration for kidney transplantation due to his history of kidney disease, the origin of which is believed to be attributed to hypertension. Despite a history of low urine output, he reports urinating once or twice daily.    Patient has been using a sleep apnea machine for two weeks without any acute changes or issues with his breathing. No difficulty in laying flat or any reported breathing improvement upon sitting up. 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 At home, he performs household chores including standing tasks. No severe chest pain, shortness of breath, or decline in physical capacity reported with these activities. His lifestyle also involves a regulated diet and medication routine.    The patient's cardiovascular history includes abnormal heartbeat, low EF, and heart catheterization procedures. Despite previous blockages, medical management was chosen over interventions and his EF has fluctuated over time. Currently, this stands at 40%. He denies experiencing any acute changes, chest pain, or  "shortness of breath during daily activities.      The patient is open and willing to receive a kidney infected with Hepatitis C if needed for transplantation.  ROS:  Respiratory: -shortness of breath  Cardiovascular: -chest pain        He has ESRD secondary to HTN. Patient is currently on peritoneal dialysis started on 2022. Patient is dialyzing on cyclic peritoneal dialysis.  Patient reports that he is tolerating dialysis well. . He has a PD catheter for dialysis access.    He does basic activities on daily basis without any symptoms of chest pain, SOB. Ambulates without an assistive device.    Light-touch clinical frailty:   No - Occasionally or most of the time last week "Everything was an effort"  No - Unintentional weight loss in the prior year  No - do 0-1 of the following: walking for exercise, moderately strenuous chores, any other physical activities (basic house duties, lifts boxes every day, does 6000 every day at work)  26 - time to cross-arm stand 15 times  Equal and strong -  strength  Home O2: No  Hypotension: Yes, describe: with PD 1-2x per week and then uses 1.5's  Active wound: No  HIV: No  HEP C: No  Active Cancer: No  Previous neurogenic bladder/Self-cath/recurrent UTI: No  Current urinary output: Yes, describe: 2x/day  Anticoagulation/ antiplatelet therapy and reason: No  History of DM: No  Meets eligibility for SKP: He does not require insulin. He does not have history of DKA, hypoglycemia.   Previous Transplant: No   Potential Donor: No   Past Medical History:   Diagnosis Date    Disorder of kidney and ureter     Hypertension      Past Surgical History:   Procedure Laterality Date    fistillia Left     PERITONEAL CATHETER INSERTION Right      Family History   Problem Relation Age of Onset    Hypertension Mother     Diabetes Mother     Hypertension Father     Heart disease Father     No Known Problems Daughter     Hypertension Maternal Aunt     Hypertension Paternal Uncle      Social " History     Socioeconomic History    Marital status: Single    Number of children: 1   Tobacco Use    Smoking status: Never    Smokeless tobacco: Never   Substance and Sexual Activity    Alcohol use: Not Currently    Drug use: Never    Sexual activity: Yes     Partners: Female     Current Outpatient Medications   Medication Sig Dispense Refill    B complex-vitamin C-folic acid (NEPHRO-JENNIFER) 0.8 mg Tab Nephro-Jennifer      BIDIL 20-37.5 mg Tab Take by mouth 3 (three) times daily.      busPIRone (BUSPAR) 5 MG Tab Take 5 mg by mouth 2 (two) times daily.      calcitRIOL (ROCALTROL) 0.5 MCG Cap Take by mouth.      carvediloL (COREG) 25 MG tablet Take 1 tablet by mouth 2 (two) times daily.      diphenhydrAMINE (BENYLIN) 12.5 mg/5 mL liquid Take 25 mg by mouth nightly as needed (Sleep).      furosemide (LASIX) 80 MG tablet Take 1 tablet by mouth 2 (two) times a day.      gentamicin (GARAMYCIN) 0.1 % cream SMARTSIG:Sparingly Topical Daily      hydrOXYzine HCL (ATARAX) 25 MG tablet Take 1 tablet by mouth 3 times daily as needed.      lactulose (CHRONULAC) 20 gram/30 mL Soln SMARTSIG:Milliliter(s) By Mouth Daily PRN      losartan (COZAAR) 25 MG tablet Take 25 mg by mouth once daily.      pantoprazole (PROTONIX) 40 MG tablet Take 40 mg by mouth once daily.      sertraline (ZOLOFT) 50 MG tablet Take 50 mg by mouth every evening.      sucroferric oxyhydroxide (VELPHORO) 500 mg Chew 2 tablets 3 (three) times daily with meals.      tamsulosin (FLOMAX) 0.4 mg Cap Take by mouth.       No current facility-administered medications for this visit.     Review of Systems   Constitutional: Negative.    HENT: Negative.     Eyes: Negative.    Respiratory: Negative.     Cardiovascular: Negative.    Gastrointestinal: Negative.    Genitourinary: Negative.    Musculoskeletal: Negative.    Skin: Negative.    Neurological: Negative.    Endo/Heme/Allergies: Negative.    Psychiatric/Behavioral: Negative.       Blood pressure (!) 163/91, pulse 82,  "temperature 97.3 °F (36.3 °C), temperature source Temporal, height 5' 8" (1.727 m), weight 85.1 kg (187 lb 9.8 oz), SpO2 99 %.body mass index is 28.53 kg/m².  Physical Exam    General: Well-developed. Well-appearing.  Cardiovascular: Regular rate. Regular rhythm.  Lungs: Normal respiratory effort. No respiratory distress.  Extremities: No edema lower extremities.  Psychiatric: Alert.  Musculoskeletal: Able to stand up and sit down multiple times. Good  strength.       Lab Results   Component Value Date    WBC 4.51 03/01/2023    HGB 10.8 (L) 03/01/2023    HCT 35.2 (L) 03/01/2023     03/01/2023    K 4.3 03/01/2023     03/01/2023    CO2 22 (L) 03/01/2023    BUN 77 (H) 03/01/2023    CREATININE 18.2 (H) 03/01/2023    CALCIUM 9.1 03/01/2023    PHOS 5.8 (H) 03/01/2023    ALBUMIN 3.7 03/01/2023    AST 28 03/01/2023    ALT 39 03/01/2023    UTPCR 1.32 (H) 03/01/2023    .6 (H) 03/22/2024     No results found for: "PREALBUMIN", "BILIRUBINUA", "GGT", "AMYLASE", "LIPASE", "PROTEINUA", "NITRITE", "RBCUA", "WBCUA"  No results found for: "HLAABCTYPE"  Labs were reviewed with the patient.    ASSESSMENT  1. ESRD on dialysis    2. HPTH (hyperparathyroidism)    3. Essential hypertension    4. Depression, unspecified depression type    5. Heart failure with reduced ejection fraction    6. Pre-transplant evaluation for kidney transplant      Assessment & Plan    I50.1 Left ventricular failure, unspecified  N18.6 End stage renal disease  G47.33 Obstructive sleep apnea (adult) (pediatric)  I25.10 Atherosclerotic heart disease of native coronary artery without angina pectoris  Z94.0 Kidney transplant status  Z99.2 Dependence on renal dialysis  CARDIAC FUNCTION AND KIDNEY TRANSPLANT ELIGIBILITY:  - Emphasized the necessity of maintaining optimal heart function for the patient to remain eligible for kidney transplantation.  - Recommend a repeat echocardiogram in 6 months and a consultation with a cardiologist within the " same period to monitor the EF of the heart.  - Discussed the potential switch of the patient's current medication to Entresto, and advised the patient to discuss this with the cardiologist.  - Encouraged the patient to incorporate physical activity into their routine, suggesting walking or treadmill use at the gym at least 2-3 times per week.  - Educated the patient extensively on the process of kidney transplantation, including the associated risks and the role of physical activity in improving cardiac function.  KIDNEY DONATION AND TRANSPLANTATION:  - Informed the patient about the potential benefits and risks of accepting hepatitis C kidneys and poorly rated kidneys.  - Explained the possible complications associated with kidney donation.       PLAN    Repeat echo and cardiology optimization and clearance within 6 months    Transplant Candidacy:   Based on available information, Mr. Cox is a high-risk kidney transplant candidate.   Meets center eligibility for accepting HCV+ donor offer - Yes.  Patient educated on HCV+ donors. Yassine is willing to accept HCV+ donor offer - Yes   Patient is a candidate for KDPI > 85 kidney donor offer - Yes.  Final determination of transplant candidacy will be made once workup is complete and reviewed by the selection committee.    Patient advised that it is recommended that all transplant candidates, and their close contacts and household members receive Covid vaccination.    Lee Watson Jr., MD       RUST Patient Status  Functional Status: 80% - Normal activity with effort: some symptoms of disease  Physical Capacity: No Limitations    I spent a total of 131 minutes on the day of the visit.

## 2024-03-25 NOTE — PROGRESS NOTES
YEARLY LIST MANAGEMENT NOTE    Yassine Cox's kidney transplant listing status reviewed.  Patient is due for follow-up appointments on 3/2025.  Appointments will be scheduled per protocol.  Updated UNOS listing and [place]. Patient can now accept High KDPI donors due to weight now 85.1 kg.  Repeat echo to be scheduled 9/2024

## 2024-03-31 LAB
CLASS I ANTIBODIES - LUMINEX: NORMAL
CLASS I ANTIBODIES - LUMINEX: NORMAL
CLASS I ANTIBODY COMMENTS - LUMINEX: NORMAL
CLASS I ANTIBODY COMMENTS - LUMINEX: NORMAL
CLASS II ANTIBODIES - LUMINEX: NORMAL
CLASS II ANTIBODY COMMENTS - LUMINEX: NORMAL
CLASS II ANTIBODY COMMENTS - LUMINEX: NORMAL
CPRA %: 11
Lab: NORMAL
SERUM COLLECTION DT - LUMINEX CLASS I: NORMAL
SERUM COLLECTION DT - LUMINEX CLASS I: NORMAL
SERUM COLLECTION DT - LUMINEX CLASS II: NORMAL
SERUM COLLECTION DT - LUMINEX CLASS II: NORMAL
SPIM1 TESTING DATE: NORMAL
SPIM2 TESTING DATE: NORMAL
SPLIM TESTING DATE: NORMAL

## 2024-04-08 PROCEDURE — 86833 HLA CLASS II HIGH DEFIN QUAL: CPT | Mod: TXP | Performed by: NURSE PRACTITIONER

## 2024-04-08 PROCEDURE — 86832 HLA CLASS I HIGH DEFIN QUAL: CPT | Mod: TXP | Performed by: NURSE PRACTITIONER

## 2024-04-12 ENCOUNTER — LAB VISIT (OUTPATIENT)
Dept: LAB | Facility: HOSPITAL | Age: 54
End: 2024-04-12
Payer: COMMERCIAL

## 2024-04-12 DIAGNOSIS — Z76.82 AWAITING ORGAN TRANSPLANT STATUS: ICD-10-CM

## 2024-04-15 LAB
CLASS I ANTIBODY COMMENTS - LUMINEX: NORMAL
CLASS II ANTIBODIES - LUMINEX: NORMAL
CPRA %: 28
SERUM COLLECTION DT - LUMINEX CLASS I: NORMAL
SERUM COLLECTION DT - LUMINEX CLASS II: NORMAL
SPIM1 TESTING DATE: NORMAL
SPIM2 TESTING DATE: NORMAL
SPLIM TESTING DATE: NORMAL

## 2024-04-30 LAB — HPRA INTERPRETATION: NORMAL

## 2024-05-06 PROCEDURE — 99001 SPECIMEN HANDLING PT-LAB: CPT | Mod: TXP | Performed by: NURSE PRACTITIONER

## 2024-05-15 ENCOUNTER — LAB VISIT (OUTPATIENT)
Dept: LAB | Facility: HOSPITAL | Age: 54
End: 2024-05-15
Payer: COMMERCIAL

## 2024-05-15 DIAGNOSIS — Z76.82 ORGAN TRANSPLANT CANDIDATE: ICD-10-CM

## 2024-05-16 LAB
CLASS I ANTIBODY COMMENTS - LUMINEX: NORMAL
CLASS I ANTIBODY COMMENTS - LUMINEX: NORMAL
CLASS II ANTIBODIES - LUMINEX: NORMAL
CLASS II ANTIBODIES - LUMINEX: NORMAL
CLASS II ANTIBODY COMMENTS - LUMINEX: NORMAL
CPRA %: 28
CPRA %: 28
SERUM COLLECTION DT - LUMINEX CLASS I: NORMAL
SERUM COLLECTION DT - LUMINEX CLASS I: NORMAL
SERUM COLLECTION DT - LUMINEX CLASS II: NORMAL
SERUM COLLECTION DT - LUMINEX CLASS II: NORMAL
SPIM1 TESTING DATE: NORMAL
SPIM1 TESTING DATE: NORMAL
SPIM2 TESTING DATE: NORMAL
SPIM2 TESTING DATE: NORMAL
SPLIM TESTING DATE: NORMAL
SPLIM TESTING DATE: NORMAL

## 2024-05-24 ENCOUNTER — TELEPHONE (OUTPATIENT)
Dept: TRANSPLANT | Facility: CLINIC | Age: 54
End: 2024-05-24
Payer: COMMERCIAL

## 2024-05-24 NOTE — TELEPHONE ENCOUNTER
I returned patient's call and informed him that he is active on the list and we will call him to schedule repeat echo due in 9/2024. Verbalized understanding.    ----- Message from Shanika Mckeon RN sent at 5/22/2024 12:37 PM CDT -----  Regarding: FW: speak to Nurse  Contact: PT  [phone]    ----- Message -----  From: Dona López  Sent: 5/22/2024  12:28 PM CDT  To: Kidney Waitlisted Coordinator  Subject: speak to Nurse                                   The patient called requesting to speak to Nurse. States has not talked to anyone in a while. Req return call to go over current status and touch base.     No further information provided    Patient can be contacted @# [phone]

## 2024-06-05 PROCEDURE — 86832 HLA CLASS I HIGH DEFIN QUAL: CPT | Mod: TXP | Performed by: NURSE PRACTITIONER

## 2024-06-05 PROCEDURE — 86833 HLA CLASS II HIGH DEFIN QUAL: CPT | Mod: TXP | Performed by: NURSE PRACTITIONER

## 2024-06-08 ENCOUNTER — LAB VISIT (OUTPATIENT)
Dept: LAB | Facility: HOSPITAL | Age: 54
End: 2024-06-08
Payer: COMMERCIAL

## 2024-06-08 DIAGNOSIS — Z76.82 ORGAN TRANSPLANT CANDIDATE: ICD-10-CM

## 2024-06-11 LAB — HPRA INTERPRETATION: NORMAL

## 2024-07-08 PROCEDURE — 99001 SPECIMEN HANDLING PT-LAB: CPT | Mod: TXP | Performed by: NURSE PRACTITIONER

## 2024-07-11 ENCOUNTER — LAB VISIT (OUTPATIENT)
Dept: LAB | Facility: HOSPITAL | Age: 54
End: 2024-07-11
Payer: COMMERCIAL

## 2024-07-11 DIAGNOSIS — Z76.82 AWAITING ORGAN TRANSPLANT STATUS: ICD-10-CM

## 2024-08-07 PROCEDURE — 86832 HLA CLASS I HIGH DEFIN QUAL: CPT | Mod: TXP | Performed by: NURSE PRACTITIONER

## 2024-08-07 PROCEDURE — 86833 HLA CLASS II HIGH DEFIN QUAL: CPT | Mod: TXP | Performed by: NURSE PRACTITIONER

## 2024-08-14 ENCOUNTER — LAB VISIT (OUTPATIENT)
Dept: LAB | Facility: HOSPITAL | Age: 54
End: 2024-08-14
Payer: COMMERCIAL

## 2024-08-14 DIAGNOSIS — Z76.82 ORGAN TRANSPLANT CANDIDATE: ICD-10-CM

## 2024-08-31 LAB — HPRA INTERPRETATION: NORMAL

## 2024-09-03 DIAGNOSIS — Z76.82 ORGAN TRANSPLANT CANDIDATE: Primary | ICD-10-CM

## 2024-09-03 DIAGNOSIS — N18.6 END STAGE RENAL DISEASE: ICD-10-CM

## 2024-09-05 ENCOUNTER — TELEPHONE (OUTPATIENT)
Dept: TRANSPLANT | Facility: CLINIC | Age: 54
End: 2024-09-05
Payer: COMMERCIAL

## 2024-09-16 PROCEDURE — 99001 SPECIMEN HANDLING PT-LAB: CPT | Mod: TXP | Performed by: NURSE PRACTITIONER

## 2024-09-18 ENCOUNTER — LAB VISIT (OUTPATIENT)
Dept: LAB | Facility: HOSPITAL | Age: 54
End: 2024-09-18
Payer: COMMERCIAL

## 2024-09-18 DIAGNOSIS — Z76.82 AWAITING ORGAN TRANSPLANT STATUS: ICD-10-CM

## 2024-11-01 ENCOUNTER — TELEPHONE (OUTPATIENT)
Dept: TRANSPLANT | Facility: CLINIC | Age: 54
End: 2024-11-01
Payer: COMMERCIAL

## 2024-11-14 ENCOUNTER — TELEPHONE (OUTPATIENT)
Dept: TRANSPLANT | Facility: CLINIC | Age: 54
End: 2024-11-14
Payer: COMMERCIAL

## 2024-11-14 NOTE — TELEPHONE ENCOUNTER
I called patient to inform him that I received his clearance from cardiology, however, he needs to be 6 months post event and able to hold Plavix. Patient is currently still on Plavix and will be 6 months post event in February. Clinic due 3/2025.  Patient verbalized understanding that he will stay inactive until clinic and Plavix is completed.

## 2024-11-16 LAB
CLASS I ANTIBODIES - LUMINEX: NORMAL
CLASS I ANTIBODY COMMENTS - LUMINEX: NORMAL
CLASS II ANTIBODIES - LUMINEX: NEGATIVE
CPRA %: 0
CPRA %: 31
CPRA %: 31
SERUM COLLECTION DT - LUMINEX CLASS I: NORMAL
SERUM COLLECTION DT - LUMINEX CLASS II: NORMAL
SPCL1 TESTING DATE: NORMAL
SPCL2 TESTING DATE: NORMAL
SPCLU TESTING DATE: NORMAL

## 2025-01-07 DIAGNOSIS — N18.6 END STAGE RENAL DISEASE: ICD-10-CM

## 2025-01-07 DIAGNOSIS — Z76.82 AWAITING ORGAN TRANSPLANT STATUS: Primary | ICD-10-CM

## 2025-01-07 DIAGNOSIS — Z76.82 ORGAN TRANSPLANT CANDIDATE: ICD-10-CM

## 2025-01-14 ENCOUNTER — TELEPHONE (OUTPATIENT)
Dept: TRANSPLANT | Facility: CLINIC | Age: 55
End: 2025-01-14
Payer: COMMERCIAL

## 2025-01-14 NOTE — TELEPHONE ENCOUNTER
Spoke to pt confirming scheduled annual test and clinic visit on 03/21/2025. Appt reminders were mailed and pt is aware to bring care giver.

## 2025-02-17 ENCOUNTER — TELEPHONE (OUTPATIENT)
Dept: TRANSPLANT | Facility: CLINIC | Age: 55
End: 2025-02-17
Payer: COMMERCIAL

## 2025-02-17 NOTE — TELEPHONE ENCOUNTER
----- Message from Danilo Champion MD sent at 2/16/2025  9:37 PM CST -----  Looks ok  ----- Message -----  From: Shanika Mckeon RN  Sent: 2/7/2025  11:37 AM CST  To: Danilo Champion Jr., MD    Please review and comment on requested outside CT from 8/2024.  Thanks,  Shanika

## 2025-03-07 ENCOUNTER — TELEPHONE (OUTPATIENT)
Dept: TRANSPLANT | Facility: CLINIC | Age: 55
End: 2025-03-07
Payer: COMMERCIAL

## 2025-03-21 ENCOUNTER — OFFICE VISIT (OUTPATIENT)
Dept: TRANSPLANT | Facility: CLINIC | Age: 55
End: 2025-03-21
Payer: COMMERCIAL

## 2025-03-21 ENCOUNTER — HOSPITAL ENCOUNTER (OUTPATIENT)
Dept: RADIOLOGY | Facility: HOSPITAL | Age: 55
Discharge: HOME | End: 2025-03-21
Attending: NURSE PRACTITIONER
Payer: COMMERCIAL

## 2025-03-21 ENCOUNTER — RESULTS FOLLOW-UP (OUTPATIENT)
Dept: TRANSPLANT | Facility: CLINIC | Age: 55
End: 2025-03-21

## 2025-03-21 ENCOUNTER — HOSPITAL ENCOUNTER (OUTPATIENT)
Dept: CARDIOLOGY | Facility: HOSPITAL | Age: 55
Discharge: HOME | End: 2025-03-21
Attending: NURSE PRACTITIONER
Payer: COMMERCIAL

## 2025-03-21 VITALS
TEMPERATURE: 97 F | BODY MASS INDEX: 27.86 KG/M2 | DIASTOLIC BLOOD PRESSURE: 76 MMHG | OXYGEN SATURATION: 99 % | HEIGHT: 67 IN | SYSTOLIC BLOOD PRESSURE: 102 MMHG | RESPIRATION RATE: 18 BRPM | WEIGHT: 177.5 LBS | HEART RATE: 90 BPM

## 2025-03-21 VITALS
HEART RATE: 80 BPM | HEIGHT: 67 IN | WEIGHT: 187.38 LBS | SYSTOLIC BLOOD PRESSURE: 163 MMHG | BODY MASS INDEX: 29.41 KG/M2 | DIASTOLIC BLOOD PRESSURE: 91 MMHG

## 2025-03-21 DIAGNOSIS — I50.20 HEART FAILURE WITH REDUCED EJECTION FRACTION: ICD-10-CM

## 2025-03-21 DIAGNOSIS — Z76.82 AWAITING ORGAN TRANSPLANT STATUS: ICD-10-CM

## 2025-03-21 DIAGNOSIS — N18.6 ESRD ON HEMODIALYSIS: ICD-10-CM

## 2025-03-21 DIAGNOSIS — Z76.82 PATIENT ON WAITING LIST FOR KIDNEY TRANSPLANT: Primary | ICD-10-CM

## 2025-03-21 DIAGNOSIS — N25.81 SECONDARY HYPERPARATHYROIDISM: ICD-10-CM

## 2025-03-21 DIAGNOSIS — Z99.2 ESRD ON HEMODIALYSIS: ICD-10-CM

## 2025-03-21 DIAGNOSIS — I10 ESSENTIAL HYPERTENSION: ICD-10-CM

## 2025-03-21 LAB
ASCENDING AORTA: 2.91 CM
AV AREA BY CONTINUOUS VTI: 3 CM2
AV INDEX (PROSTH): 0.71
AV LVOT MEAN GRADIENT: 1 MMHG
AV LVOT PEAK GRADIENT: 1 MMHG
AV MEAN GRADIENT: 1 MMHG
AV PEAK GRADIENT: 3 MMHG
AV VALVE AREA BY VELOCITY RATIO: 3.1 CM²
AV VALVE AREA: 3 CM2
AV VELOCITY RATIO: 0.75
BSA FOR ECHO PROCEDURE: 2 M2
CV ECHO LV RWT: 0.29 CM
DOP CALC AO PEAK VEL: 0.8 M/S
DOP CALC AO VTI: 11.8 CM
DOP CALC LVOT AREA: 4.2 CM2
DOP CALC LVOT DIAMETER: 2.3 CM
DOP CALC LVOT PEAK VEL: 0.6 M/S
DOP CALC LVOT STROKE VOLUME: 34.9 CM3
DOP CALCLVOT PEAK VEL VTI: 8.4 CM
E/E' RATIO: 18 M/S
ECHO EF ESTIMATED: 22 %
ECHO LV POSTERIOR WALL: 1 CM (ref 0.6–1.1)
EJECTION FRACTION: 15 %
FRACTIONAL SHORTENING: 10.3 % (ref 28–44)
INTERVENTRICULAR SEPTUM: 0.7 CM (ref 0.6–1.1)
IVC DIAMETER: 1.87 CM
IVRT: 97 MS
LA MAJOR: 6.6 CM
LA MINOR: 6.6 CM
LA WIDTH: 5.5 CM
LEFT ATRIUM SIZE: 4.5 CM
LEFT ATRIUM VOLUME INDEX MOD: 62 ML/M2
LEFT ATRIUM VOLUME INDEX: 70 ML/M2
LEFT ATRIUM VOLUME MOD: 122 ML
LEFT ATRIUM VOLUME: 139 CM3
LEFT INTERNAL DIMENSION IN SYSTOLE: 6.1 CM (ref 2.1–4)
LEFT VENTRICLE DIASTOLIC VOLUME INDEX: 120.3 ML/M2
LEFT VENTRICLE DIASTOLIC VOLUME: 237 ML
LEFT VENTRICLE MASS INDEX: 126.9 G/M2
LEFT VENTRICLE SYSTOLIC VOLUME INDEX: 93.4 ML/M2
LEFT VENTRICLE SYSTOLIC VOLUME: 184 ML
LEFT VENTRICULAR INTERNAL DIMENSION IN DIASTOLE: 6.8 CM (ref 3.5–6)
LEFT VENTRICULAR MASS: 249.9 G
LV LATERAL E/E' RATIO: 13.3 M/S
LV SEPTAL E/E' RATIO: 26.7 M/S
MV PEAK E VEL: 0.8 M/S
OHS CV RV/LV RATIO: 0.56 CM
PISA TR MAX VEL: 2.4 M/S
RA MAJOR: 4.52 CM
RA PRESSURE ESTIMATED: 8 MMHG
RA WIDTH: 4.26 CM
RIGHT ATRIAL AREA: 15.6 CM2
RIGHT VENTRICLE DIASTOLIC BASEL DIMENSION: 3.8 CM
RV TB RVSP: 10 MMHG
RV TISSUE DOPPLER FREE WALL SYSTOLIC VELOCITY 1 (APICAL 4 CHAMBER VIEW): 4.37 CM/S
SINUS: 3.19 CM
STJ: 2.85 CM
TDI LATERAL: 0.06 M/S
TDI SEPTAL: 0.03 M/S
TDI: 0.05 M/S
TRICUSPID ANNULAR PLANE SYSTOLIC EXCURSION: 1.07 CM
TV PEAK GRADIENT: 23 MMHG
TV REST PULMONARY ARTERY PRESSURE: 31 MMHG
Z-SCORE OF LEFT VENTRICULAR DIMENSION IN END DIASTOLE: 1.83
Z-SCORE OF LEFT VENTRICULAR DIMENSION IN END SYSTOLE: 4.44

## 2025-03-21 PROCEDURE — 93306 TTE W/DOPPLER COMPLETE: CPT | Mod: TXP

## 2025-03-21 PROCEDURE — 76770 US EXAM ABDO BACK WALL COMP: CPT | Mod: 26,TXP,, | Performed by: RADIOLOGY

## 2025-03-21 PROCEDURE — 71046 X-RAY EXAM CHEST 2 VIEWS: CPT | Mod: TC,TXP

## 2025-03-21 PROCEDURE — 93306 TTE W/DOPPLER COMPLETE: CPT | Mod: 26,TXP,, | Performed by: INTERNAL MEDICINE

## 2025-03-21 PROCEDURE — 99999 PR PBB SHADOW E&M-EST. PATIENT-LVL V: CPT | Mod: PBBFAC,TXP,, | Performed by: NURSE PRACTITIONER

## 2025-03-21 PROCEDURE — 76770 US EXAM ABDO BACK WALL COMP: CPT | Mod: TC,TXP

## 2025-03-21 PROCEDURE — 71046 X-RAY EXAM CHEST 2 VIEWS: CPT | Mod: 26,TXP,, | Performed by: RADIOLOGY

## 2025-03-21 NOTE — PROGRESS NOTES
Transplant Psychosocial Evaluation Update:  Last psychosocial evaluation for transplant was completed on 3-2-2023 by Angelika Valdez LCSW.    Pt presents today in company of Gricel Messer- aunt and primary caregiver. Pt and his caregiver  present as alert, oriented to person, place, time, purpose of visit. Pt and his caregiver deny concerns about going through with transplant and state motivation and sense of preparedness for transplantation, caregiver role, psychosocial risk factors, medical risk factors, financial aspects, and lifetime commitments. All concerns and education points as per transplant psychosocial evaluation process addressed (also refer to psychosocial dated 3/12/2021). Pt actively participated in today's interview, with Gricel Messer participating as caregiver. Pt asking questions appropriately and denies changes to previous psychosocial evaluation for transplantation which we reviewed line by line with pt and, per pt choice, with pts caregiver today.    Primary Caregivers and Transportation for Transplant:  1. Gricel Messer- aunt (61 y/o) resides in [place], [phone] and drives a reliable vehicle.   2. Noemi Cox- aunt (69 y/o) resides in [place], [phone] and drives a reliable vehicle.   3. Lino Ruggiero- cousin (25 y/o) resides in [place], [phone] and drives a reliable vehicle.    The patient and his caregiver are working on a locally lodging plan at this time. - information reviewed in depth. Caregivers plan to stay at hospital as appropriate for transplant and post-transplant process.    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 Last day worked:  February 28, 2025. The patient reported that he took a leave of absent from work due to his dialysis training/ schedule.     DME: Dexcom, thermometer, and BPC    ADLS:  The patient reported that he completes his activities of daily living independently.     Household and Dependents: pt resides  alone and has no dependents at this time.    Income: Pt states ability to afford all monthly expenses and costs including for medications now and if transplanted based on income and on savings and assets.    Dialysis Adherence:  The patient reported that he has been adhering to his dialysis as scheduled.  Compliance form sent to dialysis unit on 3-7-2025.       Pt and his caregiver deny any additional concerns, stating preparedness and motivation to proceed with organ transplant.     Suitability for Transplant:   Pt remains low  for transplant at this time based on psychosocial risk factors and strengths. The patient denies any acute mental health concerns at this time.    Pt jacinto well overall with health needs and life in general, has stable supports, adequate insurance, financial stability, transportation and caregiver plans in place, and is using no substances unless prescribed.      SW recommends that pt conduct fundraising to assist pt with pay for lodging,  cost of medications, food, gas, and other transplant related needs.     Final determination of transplant candidacy will be reviewed and determined by the selection committee.    Eliz Marroquin LCSW, Kidney Transplant

## 2025-03-21 NOTE — PROGRESS NOTES
AA YEARLY PATIENT EDUCATION NOTE    Mr. Yassine Cox was seen in pre-kidney transplant clinic for yearly (or six months) evaluation for kidney, kidney/pancreas or pancreas only transplant.  The patient attended a web based education session that discussed/reviewed the following aspects of transplantation: UNOS waitlist management/multiple listings, types of organs offered (KDPI < 85%, KDPI > 85%, PHS increased risk, DCD, HCV+), financial aspects, surgical procedures, dietary instruction pre- and post-transplant, health maintenance pre- and post-transplant, post-transplant hospitalization and outpatient follow-up, potential to participate in a research protocol, and medication management and side effects. A question and answer session was provided after the presentation.    The patient was seen by all members of the multi-disciplinary team to include: Nephrologist/PA, , , Pharmacist and Dietician (if applicable).    The Patient was educated on OPTN policy change regarding race based eGFR. For Black or  Americans, this eGFR could have shown that their kidneys were working better than they were.    Because of this change, we are looking at everyones record and assessing waiting time for people who are eligible. We will be reviewing your medical records and will notify you if you are eligible. We also encouraged patient to provide span 20 labs that are not in our electronic medical records.    The patient reviewed and signed all consents for evaluation which were witnessed and sent to scanning into the [facility] chart.    The patient was given an education book and plan for further evaluation based on his individual assessment.      The patient was encouraged to call with any questions or concerns.

## 2025-03-21 NOTE — PROGRESS NOTES
Kidney Transplant Recipient Reevalulation    Referring Physician: Taiwo Lewis  Current Nephrologist: Taiwo Lewis  Waitlist Status: inactive  Dialysis Start Date: 2/12/2022    Subjective:     CC:  Annual reassessment of kidney transplant candidacy.    HPI:  Mr. Cox is a 54 y.o. year old Black or  male with ESRD secondary to HTN.  He has been on the wait list for a kidney transplant at [facility] since 2/12/2022. Initially started on peritoneal dialysis 2/12/2022 but transitioned to hemodialysis about 3 months ago. Currently doing home HD 5 days/week and tolerating well.  He has a dialysis catheter. Still has PD catheter in place but it will be removed soon.     Currently inactive due to STEMI s/p LHC with PTCA/KENDRA to LAD, first diagonal, and RCA in August 2024 requiring dual antiplatelet therapy with aspirin and Brilinta. EF during hospitilization down to 20-25%. Follows with his local cardiologist Dr. Reveles and had repeat echo done in 10/2024 with improved EF 50-55%. Echo today with significantly reduced EF down to 15%. He feels well without complaints, no CP or SOB. He did have a syncopal episode last month at work. Etiology was felt to be related to HD and too much blood pressure medications.     CXR 3/21/2025: unremarkable  CT abd 8/2024: acceptable for transplant  Renal US 3/2024: Chronic medical renal disease.   Iliacs 3/2023: triphasic bilaterally, favorable for transplant   Echo 3/21/2025: EF 15%  Colonoscopy 7/2023: normal colon, repeat in 10 years     Current Medications[1]    Past Medical History:   Diagnosis Date    Anemia     Disorder of kidney and ureter     Heart failure, unspecified     Hyperlipidemia     Hypertension     Myocardial infarction      Review of Systems   Constitutional:  Positive for fatigue. Negative for activity change and fever.   Eyes:  Negative for visual disturbance.   Respiratory:  Negative for cough and shortness of breath.    Cardiovascular:  Negative for  "chest pain and leg swelling.   Gastrointestinal:  Negative for abdominal pain, constipation, diarrhea and nausea.   Genitourinary:  Positive for decreased urine volume. Negative for difficulty urinating, frequency and hematuria.        Still urinating    Musculoskeletal:  Negative for arthralgias and myalgias.   Skin:  Negative for wound.   Neurological:  Negative for weakness.   Psychiatric/Behavioral:  Negative for sleep disturbance.      Objective:   body mass index is 27.85 kg/m².  /76 (BP Location: Right arm, Patient Position: Sitting)   Pulse 90   Temp 97.2 °F (36.2 °C) (Temporal)   Resp 18   Ht 5' 6.93" (1.7 m)   Wt 80.5 kg (177 lb 7.5 oz)   SpO2 99%   BMI 27.85 kg/m²     Physical Exam  Vitals and nursing note reviewed.   Constitutional:       Appearance: Normal appearance.   Cardiovascular:      Rate and Rhythm: Normal rate and regular rhythm.      Heart sounds: Normal heart sounds.      Comments: permacath  Pulmonary:      Effort: Pulmonary effort is normal.      Breath sounds: Normal breath sounds.   Abdominal:      General: There is no distension.      Comments: PD catheter   Musculoskeletal:         General: Normal range of motion.   Skin:     General: Skin is warm and dry.   Neurological:      Mental Status: He is alert.         Labs:  Lab Results   Component Value Date    WBC 3.6 (L) 02/27/2025    HGB 9.3 (L) 02/27/2025    HCT 27.9 (L) 02/27/2025     03/01/2023    K 4.3 03/01/2023     03/01/2023    CO2 22 (L) 03/01/2023    BUN 38 (H) 03/14/2025    CREATININE 18.2 (H) 03/01/2023    EGFRNORACEVR 2.8 (A) 03/01/2023    CALCIUM 9.1 03/01/2023    PHOS 5.8 (H) 03/01/2023    ALBUMIN 3.7 03/01/2023    AST 28 03/01/2023    ALT 39 03/01/2023    UTPCR 1.32 (H) 03/01/2023    .1 (H) 03/21/2025       Lab Results   Component Value Date    CPRA 0 08/07/2024    CPRA 31 08/07/2024    CPRA 31 08/07/2024    CQ4LOTM B45,B44,Cw1 08/07/2024    CIABCLM WEAK--B76, A43 08/07/2024    CIIAB Negative " 08/07/2024    ABCMT DQ6--WEAK DQ8, DQ9, DR4 06/05/2024       Labs were reviewed with the patient.    Pre-transplant Workup:   Reviewed with the patient.    Assessment:     1. Patient on waiting list for kidney transplant    2. ESRD on hemodialysis    3. Heart failure with reduced ejection fraction    4. Essential hypertension    5. Secondary hyperparathyroidism    6. BMI 27.0-27.9,adult      Plan:   Currently inactive due to STEMI s/p C with PTCA/KENDRA to LAD, first diagonal, and RCA in August 2024 requiring dual antiplatelet therapy with aspirin and Brilinta. EF during hospitilization down to 20-25%. Follows with his local cardiologist Dr. Reveles and had repeat echo done in 10/2024 with improved EF 50-55%. Echo today with significantly reduced EF down to 15%. Provided copy of today's echo for him to schedule sooner visit with his cardiologist Dr. Reveles. Discussed referral to heart failure if EF remains significantly low. Advised ER if he were to develop CP or SOB.      Transplant Candidacy:   Mr. Cox is an unacceptable kidney transplant candidate due to EF 15%.  Meets center eligibility for accepting HCV+ donor offer - Yes.  Patient educated on HCV+ donors. Yassine is willing  to accept HCV+ donor offer -  Yes   Patient is a candidate for KDPI > 85 kidney donor offer - Yes.  He  will remain inactive at this time pending EF>40% and updated cardiac clearance .    Patient advised that it is recommended that all transplant candidates, and their close contacts and household members receive Covid vaccination.    Mer Guallpa NP       Follow-up:   In addition to the tests noted in the plan, Mr. Cox will continue to have reevaluation as per the standing pre-kidney transplant protocol:  Monthly blood for PRA  Annual return to clinic, except HIV positive, > 65 years of age, or pancreas transplant candidates who will be scheduled to see transplant every 6 months while in pre-transplant phase  Annual re-testing: CXR, EKG,  yearly mammograms for women over 40 and PSA for males over 40, cardiology follow-up as recommended by initial cardiology pre-transplant evaluation  Renal ultrasound every 2 years  Baseline colonoscopy after age 50 and repeated as recommended    UNOS Patient Status  Functional Status: 80% - Normal activity with effort: some symptoms of disease  Physical Capacity: No Limitations           [1]   Current Outpatient Medications   Medication Sig Dispense Refill    B complex-vitamin C-folic acid (NEPHRO-JENNIFER) 0.8 mg Tab Nephro-Jennifer      calcitRIOL (ROCALTROL) 0.5 MCG Cap Take by mouth.      carvediloL (COREG) 25 MG tablet Take 1 tablet by mouth 2 (two) times daily.      diphenhydrAMINE (BENYLIN) 12.5 mg/5 mL liquid Take 25 mg by mouth nightly as needed (Sleep).      furosemide (LASIX) 80 MG tablet Take 1 tablet by mouth 2 (two) times a day.      hydrOXYzine HCL (ATARAX) 25 MG tablet Take 1 tablet by mouth 3 times daily as needed.      lactulose (CHRONULAC) 20 gram/30 mL Soln SMARTSIG:Milliliter(s) By Mouth Daily PRN      losartan (COZAAR) 25 MG tablet Take 25 mg by mouth once daily.      pantoprazole (PROTONIX) 40 MG tablet Take 40 mg by mouth once daily.      sertraline (ZOLOFT) 50 MG tablet Take 50 mg by mouth every evening.      sucroferric oxyhydroxide (VELPHORO) 500 mg Chew 2 tablets 3 (three) times daily with meals.      tamsulosin (FLOMAX) 0.4 mg Cap Take 0.4 mg by mouth once daily.      BIDIL 20-37.5 mg Tab Take by mouth 3 (three) times daily. (Patient not taking: Reported on 3/21/2025)      busPIRone (BUSPAR) 5 MG Tab Take 5 mg by mouth 2 (two) times daily. (Patient not taking: Reported on 3/21/2025)      gentamicin (GARAMYCIN) 0.1 % cream SMARTSIG:Sparingly Topical Daily (Patient not taking: Reported on 3/21/2025)       No current facility-administered medications for this visit.

## 2025-03-21 NOTE — LETTER
March 24, 2025        Taiwo Lewis  [address] [place] [address]  Phone: [phone]  Fax: [phone]             [facility]- Transplant 1st Fl  [address]  [facility] [address]  Phone: [phone]   Patient: Yassine Cox   MR Number: [id number]   YOB: 1970   Date of Visit: 3/21/2025       Dear Dr. Taiwo Lewis    Thank you for referring Yassine Cox to me for evaluation. Attached you will find relevant portions of my assessment and plan of care.    If you have questions, please do not hesitate to call me. I look forward to following Yassine Cox along with you.    Sincerely,    Mer Guallpa, GAY    Enclosure    If you would like to receive this communication electronically, please contact [email or web address] or [phone] to request SureVisit Link access.    SureVisit Link is a tool which provides read-only access to select patient information with whom you have a relationship. Its easy to use and provides real time access to review your patients record including encounter summaries, notes, results, and demographic information.    If you feel you have received this communication in error or would no longer like to receive these types of communications, please e-mail [email or web address]

## 2025-07-21 ENCOUNTER — TELEPHONE (OUTPATIENT)
Dept: TRANSPLANT | Facility: CLINIC | Age: 55
End: 2025-07-21
Payer: COMMERCIAL

## 2025-08-18 PROCEDURE — 36415 COLL VENOUS BLD VENIPUNCTURE: CPT | Mod: TXP

## 2025-08-18 PROCEDURE — 86832 HLA CLASS I HIGH DEFIN QUAL: CPT | Mod: TXP | Performed by: NURSE PRACTITIONER

## 2025-08-18 PROCEDURE — 86833 HLA CLASS II HIGH DEFIN QUAL: CPT | Mod: TXP | Performed by: NURSE PRACTITIONER

## 2025-08-25 ENCOUNTER — LAB VISIT (OUTPATIENT)
Dept: LAB | Facility: HOSPITAL | Age: 55
End: 2025-08-25
Payer: COMMERCIAL

## 2025-08-25 DIAGNOSIS — Z76.82 AWAITING ORGAN TRANSPLANT STATUS: ICD-10-CM

## 2025-08-26 LAB — HPRA INTERPRETATION: NORMAL
